# Patient Record
Sex: FEMALE | Race: ASIAN | NOT HISPANIC OR LATINO
[De-identification: names, ages, dates, MRNs, and addresses within clinical notes are randomized per-mention and may not be internally consistent; named-entity substitution may affect disease eponyms.]

---

## 2017-01-09 ENCOUNTER — LABORATORY RESULT (OUTPATIENT)
Age: 42
End: 2017-01-09

## 2017-01-09 ENCOUNTER — APPOINTMENT (OUTPATIENT)
Dept: INTERNAL MEDICINE | Facility: CLINIC | Age: 42
End: 2017-01-09

## 2017-01-09 ENCOUNTER — FORM ENCOUNTER (OUTPATIENT)
Age: 42
End: 2017-01-09

## 2017-01-09 VITALS
SYSTOLIC BLOOD PRESSURE: 130 MMHG | DIASTOLIC BLOOD PRESSURE: 82 MMHG | BODY MASS INDEX: 36.44 KG/M2 | HEIGHT: 61 IN | WEIGHT: 193 LBS | TEMPERATURE: 98.8 F

## 2017-01-09 DIAGNOSIS — R10.13 EPIGASTRIC PAIN: ICD-10-CM

## 2017-01-10 ENCOUNTER — APPOINTMENT (OUTPATIENT)
Dept: ULTRASOUND IMAGING | Facility: CLINIC | Age: 42
End: 2017-01-10

## 2017-01-10 ENCOUNTER — OUTPATIENT (OUTPATIENT)
Dept: OUTPATIENT SERVICES | Facility: HOSPITAL | Age: 42
LOS: 1 days | End: 2017-01-10
Payer: COMMERCIAL

## 2017-01-10 DIAGNOSIS — R10.13 EPIGASTRIC PAIN: ICD-10-CM

## 2017-01-10 LAB
ALBUMIN SERPL ELPH-MCNC: 3.7 G/DL
ALP BLD-CCNC: 68 U/L
ALT SERPL-CCNC: 106 U/L
AMYLASE/CREAT SERPL: 60 U/L
ANION GAP SERPL CALC-SCNC: 11 MMOL/L
AST SERPL-CCNC: 125 U/L
BASOPHILS # BLD AUTO: 0 K/UL
BASOPHILS NFR BLD AUTO: 0 %
BILIRUB SERPL-MCNC: 0.3 MG/DL
BUN SERPL-MCNC: 15 MG/DL
CALCIUM SERPL-MCNC: 9.6 MG/DL
CHLORIDE SERPL-SCNC: 102 MMOL/L
CO2 SERPL-SCNC: 22 MMOL/L
CREAT SERPL-MCNC: 0.93 MG/DL
EOSINOPHIL # BLD AUTO: 0.08 K/UL
EOSINOPHIL NFR BLD AUTO: 0.9 %
GLUCOSE SERPL-MCNC: 134 MG/DL
HCT VFR BLD CALC: 40 %
HGB BLD-MCNC: 12.7 G/DL
LPL SERPL-CCNC: 33 U/L
LYMPHOCYTES # BLD AUTO: 1.8 K/UL
LYMPHOCYTES NFR BLD AUTO: 20 %
MAN DIFF?: NORMAL
MCHC RBC-ENTMCNC: 27.1 PG
MCHC RBC-ENTMCNC: 31.8 GM/DL
MCV RBC AUTO: 85.5 FL
MONOCYTES # BLD AUTO: 0.66 K/UL
MONOCYTES NFR BLD AUTO: 7.3 %
NEUTROPHILS # BLD AUTO: 6.06 K/UL
NEUTROPHILS NFR BLD AUTO: 67.3 %
PLATELET # BLD AUTO: 265 K/UL
POTASSIUM SERPL-SCNC: 4.5 MMOL/L
PROT SERPL-MCNC: 7.7 G/DL
RBC # BLD: 4.68 M/UL
RBC # FLD: 14.6 %
SODIUM SERPL-SCNC: 135 MMOL/L
WBC # FLD AUTO: 9.01 K/UL

## 2017-01-10 PROCEDURE — 76700 US EXAM ABDOM COMPLETE: CPT

## 2017-01-25 ENCOUNTER — OUTPATIENT (OUTPATIENT)
Dept: OUTPATIENT SERVICES | Facility: HOSPITAL | Age: 42
LOS: 1 days | End: 2017-01-25

## 2017-01-25 VITALS
HEIGHT: 65 IN | DIASTOLIC BLOOD PRESSURE: 76 MMHG | RESPIRATION RATE: 18 BRPM | HEART RATE: 72 BPM | TEMPERATURE: 98 F | WEIGHT: 198.42 LBS | OXYGEN SATURATION: 98 % | SYSTOLIC BLOOD PRESSURE: 115 MMHG

## 2017-01-25 DIAGNOSIS — Z98.890 OTHER SPECIFIED POSTPROCEDURAL STATES: Chronic | ICD-10-CM

## 2017-01-25 DIAGNOSIS — Z01.818 ENCOUNTER FOR OTHER PREPROCEDURAL EXAMINATION: ICD-10-CM

## 2017-01-25 DIAGNOSIS — K80.20 CALCULUS OF GALLBLADDER WITHOUT CHOLECYSTITIS WITHOUT OBSTRUCTION: ICD-10-CM

## 2017-01-25 DIAGNOSIS — K80.60 CALCULUS OF GALLBLADDER AND BILE DUCT WITH CHOLECYSTITIS, UNSPECIFIED, WITHOUT OBSTRUCTION: ICD-10-CM

## 2017-01-25 RX ORDER — CEFAZOLIN SODIUM 1 G
2000 VIAL (EA) INJECTION ONCE
Qty: 0 | Refills: 0 | Status: DISCONTINUED | OUTPATIENT
Start: 2017-01-31 | End: 2017-02-15

## 2017-01-25 NOTE — H&P PST ADULT - NEUROLOGICAL DETAILS
responds to pain/cranial nerves intact/sensation intact/alert and oriented x 3/responds to verbal commands

## 2017-01-25 NOTE — H&P PST ADULT - LYMPHATIC
anterior cervical R/anterior cervical L/supraclavicular L/posterior cervical R/supraclavicular R/posterior cervical L

## 2017-01-25 NOTE — H&P PST ADULT - NSANTHOSAYNRD_GEN_A_CORE
No. ORESTES screening performed.  STOP BANG Legend: 0-2 = LOW Risk; 3-4 = INTERMEDIATE Risk; 5-8 = HIGH Risk

## 2017-01-25 NOTE — H&P PST ADULT - RS GEN PE MLT RESP DETAILS PC
normal/good air movement/breath sounds equal/airway patent/respirations non-labored/clear to auscultation bilaterally

## 2017-01-25 NOTE — H&P PST ADULT - HISTORY OF PRESENT ILLNESS
42 y/o old female with no PMH ,Patient has been experiencing epigastric ,mostly RUQ pain which radiating to the back since 1/9/2017 , seen by PMD all work up done. Pt denies any chest pain ,SOB, Palpitations ,nausea or vomiting, fever or chills. Now coming in for Laparoscopic cholecystectomy on 1/31/2017. 42 y/o old female with no PMH ,Patient has been experiencing epigastric, mostly RUQ pain which radiating to the back since 1/9/2017 , seen by PMD all work up done. Pt denies any chest pain ,SOB, Palpitations ,nausea or vomiting, fever or chills. Now coming in for Laparoscopic cholecystectomy on 1/31/2017. 40 y/o old female with no PMH ,Patient has been experiencing epigastric, mostly RUQ pain which radiating to the back since 1/9/2017. Pt was seen by PMD and gastroenterologist all work up done.Pt denies any chest pain ,SOB, Palpitations ,nausea , vomiting, fever or chills. Now coming in for Laparoscopic cholecystectomy on 1/31/2017. 40 y/o old female with no PMH ,Patient has been experiencing epigastric pain,mostly RUQ area radiating to the back since 1/9/2017. Pt was seen by PMD and gastroenterologist all work up done. Pt denies any chest pain ,SOB, Palpitations ,nausea ,vomiting, fever or chills. Now coming in for Laparoscopic cholecystectomy on 1/31/2017.

## 2017-01-25 NOTE — H&P PST ADULT - MUSCULOSKELETAL
negative detailed exam no calf tenderness/no joint erythema/normal/no joint swelling/ROM intact/no joint warmth

## 2017-01-30 ENCOUNTER — RESULT REVIEW (OUTPATIENT)
Age: 42
End: 2017-01-30

## 2017-01-31 ENCOUNTER — OUTPATIENT (OUTPATIENT)
Dept: OUTPATIENT SERVICES | Facility: HOSPITAL | Age: 42
LOS: 1 days | End: 2017-01-31
Payer: COMMERCIAL

## 2017-01-31 ENCOUNTER — TRANSCRIPTION ENCOUNTER (OUTPATIENT)
Age: 42
End: 2017-01-31

## 2017-01-31 VITALS
OXYGEN SATURATION: 100 % | HEART RATE: 80 BPM | TEMPERATURE: 98 F | RESPIRATION RATE: 20 BRPM | SYSTOLIC BLOOD PRESSURE: 122 MMHG | DIASTOLIC BLOOD PRESSURE: 86 MMHG

## 2017-01-31 VITALS
TEMPERATURE: 98 F | DIASTOLIC BLOOD PRESSURE: 76 MMHG | HEART RATE: 89 BPM | RESPIRATION RATE: 17 BRPM | SYSTOLIC BLOOD PRESSURE: 122 MMHG | OXYGEN SATURATION: 99 %

## 2017-01-31 DIAGNOSIS — K80.20 CALCULUS OF GALLBLADDER WITHOUT CHOLECYSTITIS WITHOUT OBSTRUCTION: ICD-10-CM

## 2017-01-31 DIAGNOSIS — Z98.890 OTHER SPECIFIED POSTPROCEDURAL STATES: Chronic | ICD-10-CM

## 2017-01-31 LAB — HCG UR QL: NEGATIVE — SIGNIFICANT CHANGE UP

## 2017-01-31 PROCEDURE — 81025 URINE PREGNANCY TEST: CPT

## 2017-01-31 PROCEDURE — 47562 LAPAROSCOPIC CHOLECYSTECTOMY: CPT

## 2017-01-31 RX ORDER — ONDANSETRON 8 MG/1
4 TABLET, FILM COATED ORAL ONCE
Qty: 0 | Refills: 0 | Status: COMPLETED | OUTPATIENT
Start: 2017-01-31 | End: 2017-01-31

## 2017-01-31 RX ORDER — SODIUM CHLORIDE 9 MG/ML
3 INJECTION INTRAMUSCULAR; INTRAVENOUS; SUBCUTANEOUS EVERY 8 HOURS
Qty: 0 | Refills: 0 | Status: DISCONTINUED | OUTPATIENT
Start: 2017-01-31 | End: 2017-01-31

## 2017-01-31 RX ORDER — HYDROMORPHONE HYDROCHLORIDE 2 MG/ML
0.5 INJECTION INTRAMUSCULAR; INTRAVENOUS; SUBCUTANEOUS
Qty: 0 | Refills: 0 | Status: DISCONTINUED | OUTPATIENT
Start: 2017-01-31 | End: 2017-01-31

## 2017-01-31 RX ORDER — SODIUM CHLORIDE 9 MG/ML
1000 INJECTION, SOLUTION INTRAVENOUS
Qty: 0 | Refills: 0 | Status: DISCONTINUED | OUTPATIENT
Start: 2017-01-31 | End: 2017-02-15

## 2017-01-31 RX ORDER — OXYCODONE AND ACETAMINOPHEN 5; 325 MG/1; MG/1
1 TABLET ORAL
Qty: 15 | Refills: 0
Start: 2017-01-31

## 2017-01-31 RX ORDER — NORETHINDRONE AND ETHINYL ESTRADIOL 0.4-0.035
1 KIT ORAL
Qty: 0 | Refills: 0 | COMMUNITY

## 2017-01-31 RX ADMIN — HYDROMORPHONE HYDROCHLORIDE 0.5 MILLIGRAM(S): 2 INJECTION INTRAMUSCULAR; INTRAVENOUS; SUBCUTANEOUS at 20:45

## 2017-01-31 RX ADMIN — HYDROMORPHONE HYDROCHLORIDE 0.5 MILLIGRAM(S): 2 INJECTION INTRAMUSCULAR; INTRAVENOUS; SUBCUTANEOUS at 21:00

## 2017-01-31 RX ADMIN — SODIUM CHLORIDE 50 MILLILITER(S): 9 INJECTION, SOLUTION INTRAVENOUS at 20:54

## 2017-01-31 RX ADMIN — ONDANSETRON 4 MILLIGRAM(S): 8 TABLET, FILM COATED ORAL at 21:40

## 2017-01-31 NOTE — BRIEF OPERATIVE NOTE - POST-OP DX
Biliary colic  01/31/2017    Active  Last, Aj KELLY  Chronic cholecystitis  01/31/2017    Active  Last, Aj KELLY

## 2017-01-31 NOTE — BRIEF OPERATIVE NOTE - OPERATION/FINDINGS
Mildly intrahepatic gallbladder. Chronic cholecystitis. Laparoscopic cholecystectomy performed without apparent injury to surrounding structures.

## 2017-01-31 NOTE — ASU DISCHARGE PLAN (ADULT/PEDIATRIC). - MEDICATION SUMMARY - MEDICATIONS TO TAKE
I will START or STAY ON the medications listed below when I get home from the hospital:    acetaminophen-oxyCODONE 325 mg-5 mg oral tablet  -- 1 to 2 tab(s) by mouth every 4 to 6 hours, As Needed for Moderate to Severe Pain MDD:8 tablets  -- Caution federal law prohibits the transfer of this drug to any person other  than the person for whom it was prescribed.  May cause drowsiness.  Alcohol may intensify this effect.  Use care when operating dangerous machinery.  This prescription cannot be refilled.  This product contains acetaminophen.  Do not use  with any other product containing acetaminophen to prevent possible liver damage.  Using more of this medication than prescribed may cause serious breathing problems.    -- Indication: For Pain control

## 2017-01-31 NOTE — ASU DISCHARGE PLAN (ADULT/PEDIATRIC). - SPECIAL INSTRUCTIONS
Please follow up with Dr. Fam within 1-2 weeks after discharge from the hospital. You may call [phone] to schedule an appointment. Shower after 24 hours from surgery. Pat wounds dry, do not scrub. You have special dressings on your wounds called steri-strips. These will fall on their own in 1-2 weeks or will be removed in office. Do not scrub them after showering. Take pain medication as needed. A prescription has been submitted to your pharmacy for you.

## 2017-02-07 LAB — SURGICAL PATHOLOGY STUDY: SIGNIFICANT CHANGE UP

## 2017-04-06 ENCOUNTER — CHART COPY (OUTPATIENT)
Age: 42
End: 2017-04-06

## 2017-05-04 ENCOUNTER — EMERGENCY (EMERGENCY)
Facility: HOSPITAL | Age: 42
LOS: 1 days | Discharge: HOME | End: 2017-05-04
Attending: EMERGENCY MEDICINE | Admitting: EMERGENCY MEDICINE
Payer: COMMERCIAL

## 2017-05-04 ENCOUNTER — APPOINTMENT (OUTPATIENT)
Dept: INTERNAL MEDICINE | Facility: CLINIC | Age: 42
End: 2017-05-04

## 2017-05-04 VITALS
TEMPERATURE: 98 F | SYSTOLIC BLOOD PRESSURE: 126 MMHG | HEART RATE: 85 BPM | DIASTOLIC BLOOD PRESSURE: 86 MMHG | RESPIRATION RATE: 17 BRPM | OXYGEN SATURATION: 98 %

## 2017-05-04 VITALS
HEART RATE: 121 BPM | TEMPERATURE: 99 F | RESPIRATION RATE: 17 BRPM | SYSTOLIC BLOOD PRESSURE: 140 MMHG | DIASTOLIC BLOOD PRESSURE: 94 MMHG | OXYGEN SATURATION: 97 %

## 2017-05-04 DIAGNOSIS — Z98.890 OTHER SPECIFIED POSTPROCEDURAL STATES: Chronic | ICD-10-CM

## 2017-05-04 DIAGNOSIS — Z88.5 ALLERGY STATUS TO NARCOTIC AGENT: ICD-10-CM

## 2017-05-04 DIAGNOSIS — R21 RASH AND OTHER NONSPECIFIC SKIN ERUPTION: ICD-10-CM

## 2017-05-04 DIAGNOSIS — Z88.8 ALLERGY STATUS TO OTHER DRUGS, MEDICAMENTS AND BIOLOGICAL SUBSTANCES STATUS: ICD-10-CM

## 2017-05-04 DIAGNOSIS — Z79.899 OTHER LONG TERM (CURRENT) DRUG THERAPY: ICD-10-CM

## 2017-05-04 DIAGNOSIS — L50.9 URTICARIA, UNSPECIFIED: ICD-10-CM

## 2017-05-04 DIAGNOSIS — R00.0 TACHYCARDIA, UNSPECIFIED: ICD-10-CM

## 2017-05-04 DIAGNOSIS — Z98.890 OTHER SPECIFIED POSTPROCEDURAL STATES: ICD-10-CM

## 2017-05-04 PROCEDURE — 99283 EMERGENCY DEPT VISIT LOW MDM: CPT

## 2017-05-04 RX ORDER — AZITHROMYCIN 250 MG/1
250 TABLET, FILM COATED ORAL
Qty: 1 | Refills: 1 | Status: DISCONTINUED | COMMUNITY
Start: 2017-04-06 | End: 2017-05-04

## 2017-05-04 RX ORDER — FAMOTIDINE 10 MG/ML
20 INJECTION INTRAVENOUS DAILY
Qty: 0 | Refills: 0 | Status: DISCONTINUED | OUTPATIENT
Start: 2017-05-04 | End: 2017-05-08

## 2017-05-04 RX ADMIN — FAMOTIDINE 20 MILLIGRAM(S): 10 INJECTION INTRAVENOUS at 20:37

## 2017-05-04 RX ADMIN — Medication 60 MILLIGRAM(S): at 20:37

## 2017-05-04 NOTE — ED PROVIDER NOTE - OBJECTIVE STATEMENT
41 year old female with no significant PMH presents c/o hives x2 hours. Patient states that she ate sushi at 5:30pm today and noticed several hives and extreme pruritis of her whole body within 20 minutes.  She took 2 benadryl at 6pm which reduced her pruritis significantly.  She denies HA, N/V, changes in her voice, difficulty breathing, difficulty swallowing.     Patient was seen by her PMD earlier today for consultation of a "red yifan" on the interior portion of her left knee.  Patient states that she was prescribed an antifungal for her leg but the prescription was never sent to her pharmacy. 41 year old female with no significant PMH presents c/o hives x2 hours. Patient states that she ate sushi at 5:30pm today and noticed several hives and extreme pruritis of her whole body within 20 minutes.  She took 2 benadryl at 6pm which reduced her pruritis significantly.  She denies HA, N/V, changes in her voice, difficulty breathing, difficulty swallowing.      Patient was seen by her PMD earlier today for consultation of a "red yifan" on the interior portion of her left knee.  Patient states that she was prescribed an antifungal for her leg but the prescription was never sent to her pharmacy.       Attending note. Patient was seen in fast track room #6. Agree with the above. Patient had sushi at approximately 5:30 in detail to high at approximately 5:50 PM patient took 50 mg of Benadryl at 6 PM. Patient had itchy rash over most of her body. Patient had a similar reaction many years ago.  Patient has been at that restaurant and is a male previously without ill effect. Patient denies any difficulty breathing, change in voice or swelling of the lips, tongue or pharynx. Patient denies any chest tightness or abdominal pain. Patient denies any lightheadedness.       Patient saw her PMD today for a rash on the medial left knee was prescribed antifungal which the patient has not yet received.

## 2017-05-04 NOTE — ED PROVIDER NOTE - CARE PLAN
Principal Discharge DX:	Hives Principal Discharge DX:	Hives  Instructions for follow-up, activity and diet:	1. Take Prednisone 2, 20mg tablets once a day for 4 days. Take Benadryl as needed for symptom relief.    2. Apply Clotrimazole cream 2 times a day over left inner knee rash for 2 weeks  3. Follow up with PCP within 24-48 hours  4. Return to ED for worsening of symptoms including difficulty breathing, shortness of breath, HA, dizziness, worsening of rash, fever and other concerning symptoms.

## 2017-05-04 NOTE — ED ADULT NURSE NOTE - PMH
Calculus of gallbladder and bile duct w/cholecystitis w/o obstruction    Cervical radiculopathy    Obesity (BMI 30-39.9)    Shoulder impingement, right  Rt shoulder

## 2017-05-04 NOTE — ED PROVIDER NOTE - MEDICAL DECISION MAKING DETAILS
Attending note-allergic reaction most likely to sushi. Patient took Benadryl 50 mg with improvement. Steroids and H2 blocker. Prescription for steroids for the next 48-72 hours and continued Benadryl q.6 hours. Patient advised to return to the ER if any airway issues arise.

## 2017-05-04 NOTE — ED PROVIDER NOTE - PLAN OF CARE
1. Take Prednisone 2, 20mg tablets once a day for 4 days. Take Benadryl as needed for symptom relief.    2. Apply Clotrimazole cream 2 times a day over left inner knee rash for 2 weeks  3. Follow up with PCP within 24-48 hours  4. Return to ED for worsening of symptoms including difficulty breathing, shortness of breath, HA, dizziness, worsening of rash, fever and other concerning symptoms.

## 2017-05-04 NOTE — ED ADULT NURSE NOTE - OBJECTIVE STATEMENT
41 y.o F c/o generalized hives and itching s/p eating sushi at 5:30 PM today. A&Ox3, ambulatory. Pt states after eating sushi,  20 min later she developed hives and itching. Upon exam hives noted to back, abdomen, chest, B/L upper and lower extremities. Pt took 2 benadryl PTA. Airway patent, respirations CTA B/L, nonlabored, no tongue or lip swelling no difficulty swallowing. O2 sat 98% RA. Denies CP, SOB, N/V/D, fevers, chills, HA, dizziness. Safety and comfort provided.

## 2017-05-04 NOTE — ED PROVIDER NOTE - SKIN RASH DESCRIPTION
RAISED/confluent raised non-blanching wheals over chest, back, abdomen and lower extremities b/l/WHEAL/REDDENED

## 2017-05-05 ENCOUNTER — OTHER (OUTPATIENT)
Age: 42
End: 2017-05-05

## 2017-05-08 ENCOUNTER — OTHER (OUTPATIENT)
Age: 42
End: 2017-05-08

## 2017-05-08 ENCOUNTER — MESSAGE (OUTPATIENT)
Age: 42
End: 2017-05-08

## 2017-06-07 ENCOUNTER — RESULT REVIEW (OUTPATIENT)
Age: 42
End: 2017-06-07

## 2017-06-14 ENCOUNTER — APPOINTMENT (OUTPATIENT)
Dept: ORTHOPEDIC SURGERY | Facility: CLINIC | Age: 42
End: 2017-06-14

## 2017-06-26 ENCOUNTER — APPOINTMENT (OUTPATIENT)
Dept: ORTHOPEDIC SURGERY | Facility: CLINIC | Age: 42
End: 2017-06-26

## 2017-06-26 VITALS
SYSTOLIC BLOOD PRESSURE: 106 MMHG | WEIGHT: 192 LBS | HEART RATE: 60 BPM | HEIGHT: 61 IN | BODY MASS INDEX: 36.25 KG/M2 | DIASTOLIC BLOOD PRESSURE: 71 MMHG

## 2017-07-05 ENCOUNTER — CHART COPY (OUTPATIENT)
Age: 42
End: 2017-07-05

## 2017-07-05 ENCOUNTER — APPOINTMENT (OUTPATIENT)
Dept: ORTHOPEDIC SURGERY | Facility: CLINIC | Age: 42
End: 2017-07-05

## 2017-07-17 ENCOUNTER — APPOINTMENT (OUTPATIENT)
Dept: ORTHOPEDIC SURGERY | Facility: CLINIC | Age: 42
End: 2017-07-17

## 2017-07-17 VITALS — BODY MASS INDEX: 35.68 KG/M2 | HEIGHT: 61 IN | WEIGHT: 189 LBS

## 2017-07-24 ENCOUNTER — APPOINTMENT (OUTPATIENT)
Dept: ORTHOPEDIC SURGERY | Facility: CLINIC | Age: 42
End: 2017-07-24

## 2017-08-22 ENCOUNTER — APPOINTMENT (OUTPATIENT)
Dept: ORTHOPEDIC SURGERY | Facility: CLINIC | Age: 42
End: 2017-08-22
Payer: OTHER MISCELLANEOUS

## 2017-08-22 VITALS
HEART RATE: 92 BPM | DIASTOLIC BLOOD PRESSURE: 86 MMHG | BODY MASS INDEX: 35.68 KG/M2 | WEIGHT: 189 LBS | SYSTOLIC BLOOD PRESSURE: 132 MMHG | HEIGHT: 61 IN

## 2017-08-22 PROCEDURE — 73562 X-RAY EXAM OF KNEE 3: CPT | Mod: LT

## 2017-08-22 PROCEDURE — 99213 OFFICE O/P EST LOW 20 MIN: CPT

## 2017-09-13 ENCOUNTER — APPOINTMENT (OUTPATIENT)
Dept: ORTHOPEDIC SURGERY | Facility: CLINIC | Age: 42
End: 2017-09-13
Payer: OTHER MISCELLANEOUS

## 2017-09-13 VITALS
HEART RATE: 82 BPM | HEIGHT: 61 IN | SYSTOLIC BLOOD PRESSURE: 117 MMHG | DIASTOLIC BLOOD PRESSURE: 83 MMHG | WEIGHT: 187 LBS | BODY MASS INDEX: 35.3 KG/M2

## 2017-09-13 PROCEDURE — 99213 OFFICE O/P EST LOW 20 MIN: CPT

## 2017-11-08 ENCOUNTER — CHART COPY (OUTPATIENT)
Age: 42
End: 2017-11-08

## 2017-11-28 ENCOUNTER — APPOINTMENT (OUTPATIENT)
Dept: ORTHOPEDIC SURGERY | Facility: CLINIC | Age: 42
End: 2017-11-28
Payer: OTHER MISCELLANEOUS

## 2017-11-28 VITALS
WEIGHT: 187 LBS | HEIGHT: 61 IN | BODY MASS INDEX: 35.3 KG/M2 | HEART RATE: 85 BPM | SYSTOLIC BLOOD PRESSURE: 105 MMHG | DIASTOLIC BLOOD PRESSURE: 74 MMHG

## 2017-11-28 DIAGNOSIS — M76.52 PATELLAR TENDINITIS, LEFT KNEE: ICD-10-CM

## 2017-11-28 DIAGNOSIS — S82.002A UNSPECIFIED FRACTURE OF LEFT PATELLA, INITIAL ENCOUNTER FOR CLOSED FRACTURE: ICD-10-CM

## 2017-11-28 PROCEDURE — 99213 OFFICE O/P EST LOW 20 MIN: CPT

## 2017-12-06 DIAGNOSIS — M51.36 OTHER INTERVERTEBRAL DISC DEGENERATION, LUMBAR REGION: ICD-10-CM

## 2018-01-23 ENCOUNTER — APPOINTMENT (OUTPATIENT)
Dept: INFECTIOUS DISEASE | Facility: CLINIC | Age: 43
End: 2018-01-23
Payer: SELF-PAY

## 2018-01-23 DIAGNOSIS — Z71.89 OTHER SPECIFIED COUNSELING: ICD-10-CM

## 2018-01-23 PROCEDURE — 90472 IMMUNIZATION ADMIN EACH ADD: CPT | Mod: NC

## 2018-01-23 PROCEDURE — 99401 PREV MED CNSL INDIV APPRX 15: CPT | Mod: 25

## 2018-01-23 PROCEDURE — 90471 IMMUNIZATION ADMIN: CPT | Mod: NC

## 2018-01-23 PROCEDURE — 90632 HEPA VACCINE ADULT IM: CPT

## 2018-01-23 PROCEDURE — 90691 TYPHOID VACCINE IM: CPT

## 2018-01-25 ENCOUNTER — CHART COPY (OUTPATIENT)
Age: 43
End: 2018-01-25

## 2018-02-05 ENCOUNTER — OTHER (OUTPATIENT)
Age: 43
End: 2018-02-05

## 2018-02-12 ENCOUNTER — OTHER (OUTPATIENT)
Age: 43
End: 2018-02-12

## 2018-07-09 ENCOUNTER — RESULT REVIEW (OUTPATIENT)
Age: 43
End: 2018-07-09

## 2018-08-06 ENCOUNTER — OUTPATIENT (OUTPATIENT)
Dept: OUTPATIENT SERVICES | Facility: HOSPITAL | Age: 43
LOS: 1 days | End: 2018-08-06
Payer: COMMERCIAL

## 2018-08-06 ENCOUNTER — APPOINTMENT (OUTPATIENT)
Dept: MAMMOGRAPHY | Facility: IMAGING CENTER | Age: 43
End: 2018-08-06
Payer: COMMERCIAL

## 2018-08-06 DIAGNOSIS — Z00.8 ENCOUNTER FOR OTHER GENERAL EXAMINATION: ICD-10-CM

## 2018-08-06 DIAGNOSIS — Z98.890 OTHER SPECIFIED POSTPROCEDURAL STATES: Chronic | ICD-10-CM

## 2018-08-06 PROCEDURE — 77063 BREAST TOMOSYNTHESIS BI: CPT | Mod: 26

## 2018-08-06 PROCEDURE — 77067 SCR MAMMO BI INCL CAD: CPT | Mod: 26

## 2018-08-06 PROCEDURE — 77067 SCR MAMMO BI INCL CAD: CPT

## 2018-08-06 PROCEDURE — 77063 BREAST TOMOSYNTHESIS BI: CPT

## 2018-08-29 ENCOUNTER — LABORATORY RESULT (OUTPATIENT)
Age: 43
End: 2018-08-29

## 2018-08-29 ENCOUNTER — CHART COPY (OUTPATIENT)
Age: 43
End: 2018-08-29

## 2018-09-05 ENCOUNTER — CHART COPY (OUTPATIENT)
Age: 43
End: 2018-09-05

## 2018-09-24 ENCOUNTER — CHART COPY (OUTPATIENT)
Age: 43
End: 2018-09-24

## 2019-03-04 ENCOUNTER — APPOINTMENT (OUTPATIENT)
Dept: ORTHOPEDIC SURGERY | Facility: CLINIC | Age: 44
End: 2019-03-04
Payer: COMMERCIAL

## 2019-03-04 VITALS — DIASTOLIC BLOOD PRESSURE: 79 MMHG | SYSTOLIC BLOOD PRESSURE: 127 MMHG | HEART RATE: 106 BPM

## 2019-03-04 PROCEDURE — 99215 OFFICE O/P EST HI 40 MIN: CPT | Mod: 25

## 2019-03-04 PROCEDURE — 20605 DRAIN/INJ JOINT/BURSA W/O US: CPT | Mod: LT

## 2019-03-04 PROCEDURE — 73080 X-RAY EXAM OF ELBOW: CPT | Mod: LT

## 2019-03-04 RX ORDER — METHYLPRED ACET/NACL,ISO-OS/PF 40 MG/ML
40 VIAL (ML) INJECTION
Qty: 1 | Refills: 0 | Status: COMPLETED | OUTPATIENT
Start: 2019-03-04

## 2019-03-04 RX ORDER — LIDOCAINE HYDROCHLORIDE 10 MG/ML
1 INJECTION, SOLUTION INFILTRATION; PERINEURAL
Refills: 0 | Status: COMPLETED | OUTPATIENT
Start: 2019-03-04

## 2019-03-04 RX ADMIN — LIDOCAINE HYDROCHLORIDE 0.75 %: 10 INJECTION, SOLUTION INFILTRATION; PERINEURAL at 00:00

## 2019-03-04 RX ADMIN — METHYLPREDNISOLONE ACETATE 0.75 MG/ML: 40 INJECTION, SUSPENSION INTRALESIONAL; INTRAMUSCULAR; INTRASYNOVIAL; SOFT TISSUE at 00:00

## 2019-03-04 NOTE — PHYSICAL EXAM
[de-identified] : The patient appears well nourished  and in no apparent distress.  The patient is alert and oriented to person, place, and time.   Affect and mood appear normal.    The head is normocephalic and atraumatic.  The eyes reveal normal sclera and extra ocular muscles are intact.   The neck appears normal with no jugular venous distention or masses noted.   Skin shows normal turgor with no evidence of eczema or psoriasis.  No respiratory distress noted.  The patient ambulates with a normal gait.\par \par The left elbow has slight loss of extension with discomfort on terminal extension. Tenderness and mild swelling noted over the lateral epicondyle. Pain is noted with resisted extension of the wrist and middle finger.  There is no warmth or erythema.  There is no instability to varus or valgus stress.  Elbow strength is normal. Sensation is normal.     Pulses and capillary refill are normal.     No clubbing, cyanosis, or edema noted. No lymphadenopathy. \par  [de-identified] : AP,  lateral, and oblique views of the left elbow were obtained.  There is no evidence of fracture, subluxation, or dislocation of the elbow.  The joint spaces are well maintained without evidence of degenerative arthritis.  No osteochondral lesions are noted.

## 2019-03-04 NOTE — PROCEDURE
[de-identified] : Using sterile technique, 0.75cc of depomedrol (40mg/ml) and 0.75cc of 1% plain lidocaine was drawn up into a sterile 3cc syringe.  A sterile 25 gauge needle was then affixed to the syringe.  The left lateral epicondylar area was then sterilely prepped with chlorhexidine and ethylene chloride spray was used as an anesthetic prior to injection.  The depomedrol/lidocaine mixture was injected just distal to the epicondyle.  The patient tolerated the procedure well without difficulty.  The patient was given instructions on the use of ice and anti-inflammatories post injection site soreness.  \par

## 2019-03-04 NOTE — DISCUSSION/SUMMARY
[de-identified] : Patient has evidence of a lateral epicondylitis left elbow. On today's exam I injected the elbow with Depo-Medrol and lidocaine. I gave her some stretching exercises. I would like to see her back in the next 3-4 weeks for reevaluation. Instructions were given post injection for ice analgesics.

## 2019-03-04 NOTE — HISTORY OF PRESENT ILLNESS
[de-identified] : This patient presents today with complaints of left elbow pain. He has been on naproxen 4 months. Pain is localized to the lateral epicondyle of the elbow. It is now relatively constant. Pain is worse in extension and lifting objects against gravity. Pain is 7/10. Patient cannot take anti-inflammatories due to her allergies.

## 2019-07-29 ENCOUNTER — APPOINTMENT (OUTPATIENT)
Dept: ORTHOPEDIC SURGERY | Facility: CLINIC | Age: 44
End: 2019-07-29
Payer: COMMERCIAL

## 2019-07-29 PROCEDURE — 20605 DRAIN/INJ JOINT/BURSA W/O US: CPT | Mod: LT

## 2019-07-29 PROCEDURE — 99213 OFFICE O/P EST LOW 20 MIN: CPT | Mod: 25

## 2019-08-19 DIAGNOSIS — Z01.818 ENCOUNTER FOR OTHER PREPROCEDURAL EXAMINATION: ICD-10-CM

## 2019-08-22 ENCOUNTER — LABORATORY RESULT (OUTPATIENT)
Age: 44
End: 2019-08-22

## 2019-08-27 ENCOUNTER — CHART COPY (OUTPATIENT)
Age: 44
End: 2019-08-27

## 2019-09-10 ENCOUNTER — APPOINTMENT (OUTPATIENT)
Dept: MAMMOGRAPHY | Facility: IMAGING CENTER | Age: 44
End: 2019-09-10
Payer: COMMERCIAL

## 2019-09-10 ENCOUNTER — OUTPATIENT (OUTPATIENT)
Dept: OUTPATIENT SERVICES | Facility: HOSPITAL | Age: 44
LOS: 1 days | End: 2019-09-10
Payer: COMMERCIAL

## 2019-09-10 DIAGNOSIS — Z98.890 OTHER SPECIFIED POSTPROCEDURAL STATES: Chronic | ICD-10-CM

## 2019-09-10 DIAGNOSIS — Z00.00 ENCOUNTER FOR GENERAL ADULT MEDICAL EXAMINATION WITHOUT ABNORMAL FINDINGS: ICD-10-CM

## 2019-09-10 PROCEDURE — 77067 SCR MAMMO BI INCL CAD: CPT

## 2019-09-10 PROCEDURE — 77063 BREAST TOMOSYNTHESIS BI: CPT

## 2019-09-10 PROCEDURE — 77063 BREAST TOMOSYNTHESIS BI: CPT | Mod: 26

## 2019-09-10 PROCEDURE — 77067 SCR MAMMO BI INCL CAD: CPT | Mod: 26

## 2019-09-12 RX ORDER — CLOTRIMAZOLE AND BETAMETHASONE DIPROPIONATE 10; .5 MG/ML; MG/ML
1-0.05 LOTION TOPICAL TWICE DAILY
Qty: 1 | Refills: 0 | Status: DISCONTINUED | COMMUNITY
Start: 2017-05-04 | End: 2019-09-12

## 2019-09-13 ENCOUNTER — APPOINTMENT (OUTPATIENT)
Dept: SURGERY | Facility: CLINIC | Age: 44
End: 2019-09-13

## 2019-12-11 ENCOUNTER — LABORATORY RESULT (OUTPATIENT)
Age: 44
End: 2019-12-11

## 2019-12-17 ENCOUNTER — CHART COPY (OUTPATIENT)
Age: 44
End: 2019-12-17

## 2020-02-26 RX ORDER — AZITHROMYCIN 250 MG/1
250 TABLET, FILM COATED ORAL
Qty: 1 | Refills: 1 | Status: ACTIVE | COMMUNITY
Start: 2020-02-26 | End: 1900-01-01

## 2020-02-27 ENCOUNTER — LABORATORY RESULT (OUTPATIENT)
Age: 45
End: 2020-02-27

## 2020-02-27 ENCOUNTER — APPOINTMENT (OUTPATIENT)
Dept: INTERNAL MEDICINE | Facility: CLINIC | Age: 45
End: 2020-02-27
Payer: COMMERCIAL

## 2020-02-27 VITALS
HEART RATE: 81 BPM | DIASTOLIC BLOOD PRESSURE: 80 MMHG | HEIGHT: 61 IN | SYSTOLIC BLOOD PRESSURE: 120 MMHG | TEMPERATURE: 97.6 F | OXYGEN SATURATION: 98 % | BODY MASS INDEX: 37.38 KG/M2 | WEIGHT: 198 LBS

## 2020-02-27 DIAGNOSIS — Z00.00 ENCOUNTER FOR GENERAL ADULT MEDICAL EXAMINATION W/OUT ABNORMAL FINDINGS: ICD-10-CM

## 2020-02-27 DIAGNOSIS — G47.00 INSOMNIA, UNSPECIFIED: ICD-10-CM

## 2020-02-27 DIAGNOSIS — F41.9 ANXIETY DISORDER, UNSPECIFIED: ICD-10-CM

## 2020-02-27 PROCEDURE — 99396 PREV VISIT EST AGE 40-64: CPT | Mod: 25

## 2020-02-27 PROCEDURE — 93000 ELECTROCARDIOGRAM COMPLETE: CPT

## 2020-02-27 PROCEDURE — 94010 BREATHING CAPACITY TEST: CPT

## 2020-02-27 RX ORDER — CLOBETASOL PROPIONATE 0.5 MG/G
0.05 CREAM TOPICAL TWICE DAILY
Qty: 30 | Refills: 2 | Status: DISCONTINUED | COMMUNITY
Start: 2017-02-23 | End: 2020-02-27

## 2020-02-27 RX ORDER — METAXALONE 800 MG/1
800 TABLET ORAL 3 TIMES DAILY
Qty: 30 | Refills: 0 | Status: DISCONTINUED | COMMUNITY
Start: 2017-11-08 | End: 2020-02-27

## 2020-02-27 RX ORDER — AZITHROMYCIN 250 MG/1
250 TABLET, FILM COATED ORAL
Qty: 4 | Refills: 0 | Status: DISCONTINUED | COMMUNITY
Start: 2018-01-23 | End: 2020-02-27

## 2020-02-27 RX ORDER — FLUOCINONIDE 0.5 MG/G
0.05 CREAM TOPICAL
Qty: 30 | Refills: 0 | Status: DISCONTINUED | COMMUNITY
Start: 2017-05-15 | End: 2020-02-27

## 2020-02-27 RX ORDER — CIPROFLOXACIN HYDROCHLORIDE 500 MG/1
500 TABLET, FILM COATED ORAL
Qty: 10 | Refills: 0 | Status: DISCONTINUED | COMMUNITY
Start: 2018-01-25 | End: 2020-02-27

## 2020-02-27 RX ORDER — LIDOCAINE 5% 700 MG/1
5 PATCH TOPICAL
Qty: 60 | Refills: 0 | Status: DISCONTINUED | COMMUNITY
Start: 2017-07-13 | End: 2020-02-27

## 2020-02-27 RX ORDER — ALPRAZOLAM 0.25 MG/1
0.25 TABLET ORAL
Qty: 30 | Refills: 0 | Status: DISCONTINUED | COMMUNITY
Start: 2018-02-12 | End: 2020-02-27

## 2020-02-27 RX ORDER — NORETHINDRONE 0.35 MG/1
0.35 TABLET ORAL
Qty: 28 | Refills: 0 | Status: DISCONTINUED | COMMUNITY
Start: 2017-06-07 | End: 2020-02-27

## 2020-02-27 RX ORDER — ERGOCALCIFEROL 1.25 MG/1
1.25 MG CAPSULE, LIQUID FILLED ORAL
Qty: 4 | Refills: 0 | Status: DISCONTINUED | COMMUNITY
Start: 2017-05-17 | End: 2020-02-27

## 2020-02-27 RX ORDER — PREDNISONE 20 MG/1
20 TABLET ORAL TWICE DAILY
Qty: 20 | Refills: 0 | Status: DISCONTINUED | COMMUNITY
Start: 2017-07-19 | End: 2020-02-27

## 2020-02-27 RX ORDER — CLOTRIMAZOLE AND BETAMETHASONE DIPROPIONATE 10; .5 MG/G; MG/G
1-0.05 CREAM TOPICAL TWICE DAILY
Qty: 15 | Refills: 0 | Status: DISCONTINUED | COMMUNITY
Start: 2018-01-25 | End: 2020-02-27

## 2020-02-27 RX ORDER — ALPRAZOLAM 0.25 MG/1
0.25 TABLET ORAL 3 TIMES DAILY
Qty: 30 | Refills: 0 | Status: DISCONTINUED | COMMUNITY
Start: 2018-02-05 | End: 2020-02-27

## 2020-02-27 RX ORDER — CLOTRIMAZOLE AND BETAMETHASONE DIPROPIONATE 10; .5 MG/G; MG/G
1-0.05 CREAM TOPICAL TWICE DAILY
Refills: 0 | Status: DISCONTINUED | COMMUNITY
Start: 2017-05-04 | End: 2020-02-27

## 2020-02-27 RX ORDER — PREDNISONE 20 MG/1
20 TABLET ORAL
Qty: 15 | Refills: 0 | Status: DISCONTINUED | COMMUNITY
Start: 2017-05-08 | End: 2020-02-27

## 2020-02-27 RX ORDER — LIDOCAINE 5% 700 MG/1
5 PATCH TOPICAL
Qty: 1 | Refills: 2 | Status: DISCONTINUED | COMMUNITY
Start: 2017-12-06 | End: 2020-02-27

## 2020-02-27 RX ORDER — CLOTRIMAZOLE 10 MG/G
1 CREAM TOPICAL
Qty: 15 | Refills: 0 | Status: DISCONTINUED | COMMUNITY
Start: 2017-05-04 | End: 2020-02-27

## 2020-02-27 NOTE — REVIEW OF SYSTEMS
[Negative] : Genitourinary [Fever] : no fever [Night Sweats] : no night sweats [FreeTextEntry9] : hip pain

## 2020-02-27 NOTE — ASSESSMENT
[FreeTextEntry1] : \par panic attacks - given palpitations, check labs and echo\par \par overweight - to increase exercise and watch diet\par \par hcm\par gyn / mammo / Pap\par CPE labs

## 2020-02-27 NOTE — HISTORY OF PRESENT ILLNESS
[de-identified] : \par 44 year old [employer] employee\par \par may have had 6 panic attacks since December - didn't feel faint; lasted 5 minutes\par palpitations, anxiety, hot\par a doctor at work took her pulse during one and it was normal

## 2020-02-27 NOTE — PHYSICAL EXAM
[No Acute Distress] : no acute distress [Well Nourished] : well nourished [Well Developed] : well developed [Well-Appearing] : well-appearing [Normal Sclera/Conjunctiva] : normal sclera/conjunctiva [PERRL] : pupils equal round and reactive to light [EOMI] : extraocular movements intact [Normal Oropharynx] : the oropharynx was normal [No JVD] : no jugular venous distention [Normal Outer Ear/Nose] : the outer ears and nose were normal in appearance [No Lymphadenopathy] : no lymphadenopathy [Supple] : supple [No Respiratory Distress] : no respiratory distress  [Thyroid Normal, No Nodules] : the thyroid was normal and there were no nodules present [Clear to Auscultation] : lungs were clear to auscultation bilaterally [No Accessory Muscle Use] : no accessory muscle use [Normal Rate] : normal rate  [Regular Rhythm] : with a regular rhythm [Normal S1, S2] : normal S1 and S2 [No Murmur] : no murmur heard [No Carotid Bruits] : no carotid bruits [No Abdominal Bruit] : a ~M bruit was not heard ~T in the abdomen [No Varicosities] : no varicosities [Pedal Pulses Present] : the pedal pulses are present [No Extremity Clubbing/Cyanosis] : no extremity clubbing/cyanosis [No Edema] : there was no peripheral edema [No Palpable Aorta] : no palpable aorta [Soft] : abdomen soft [Non Tender] : non-tender [Non-distended] : non-distended [No Masses] : no abdominal mass palpated [No HSM] : no HSM [Normal Posterior Cervical Nodes] : no posterior cervical lymphadenopathy [Normal Bowel Sounds] : normal bowel sounds [No CVA Tenderness] : no CVA  tenderness [Normal Anterior Cervical Nodes] : no anterior cervical lymphadenopathy [No Joint Swelling] : no joint swelling [No Spinal Tenderness] : no spinal tenderness [Grossly Normal Strength/Tone] : grossly normal strength/tone [No Focal Deficits] : no focal deficits [Coordination Grossly Intact] : coordination grossly intact [No Rash] : no rash [Deep Tendon Reflexes (DTR)] : deep tendon reflexes were 2+ and symmetric [Normal Gait] : normal gait [Normal Affect] : the affect was normal [Normal Insight/Judgement] : insight and judgment were intact

## 2020-02-27 NOTE — HEALTH RISK ASSESSMENT
[Very Good] : ~his/her~  mood as very good [Yes] : Yes [0] : 2) Feeling down, depressed, or hopeless: Not at all (0) [] : No [de-identified] : socially [de-identified] : walking 15378 steps/day [de-identified] : calorie counting [[id number]] : 0 [MammogramDate] : 10/19 [MammogramComments] : gyn = Dr. Carreon [PapSmearDate] : 10/18 [de-identified] : Patient sees an ophthalmologist regularly [de-identified] : Patient sees a dentist regularly

## 2020-02-28 ENCOUNTER — APPOINTMENT (OUTPATIENT)
Dept: INTERNAL MEDICINE | Facility: CLINIC | Age: 45
End: 2020-02-28
Payer: COMMERCIAL

## 2020-02-28 PROCEDURE — 36415 COLL VENOUS BLD VENIPUNCTURE: CPT

## 2020-03-03 LAB
ALBUMIN SERPL ELPH-MCNC: 4.3 G/DL
ALP BLD-CCNC: 70 U/L
ALT SERPL-CCNC: 25 U/L
ANION GAP SERPL CALC-SCNC: 12 MMOL/L
APPEARANCE: CLEAR
AST SERPL-CCNC: 21 U/L
BASOPHILS # BLD AUTO: 0.1 K/UL
BASOPHILS NFR BLD AUTO: 1.2 %
BILIRUB SERPL-MCNC: 0.2 MG/DL
BILIRUBIN URINE: NEGATIVE
BLOOD URINE: ABNORMAL
BUN SERPL-MCNC: 16 MG/DL
CALCIUM SERPL-MCNC: 9.9 MG/DL
CHLORIDE SERPL-SCNC: 104 MMOL/L
CHOLEST SERPL-MCNC: 191 MG/DL
CHOLEST/HDLC SERPL: 4.3 RATIO
CO2 SERPL-SCNC: 25 MMOL/L
COLOR: YELLOW
CREAT SERPL-MCNC: 0.84 MG/DL
EOSINOPHIL # BLD AUTO: 0.24 K/UL
EOSINOPHIL NFR BLD AUTO: 2.9 %
GLUCOSE QUALITATIVE U: NEGATIVE
GLUCOSE SERPL-MCNC: 109 MG/DL
HCT VFR BLD CALC: 45.2 %
HDLC SERPL-MCNC: 44 MG/DL
HGB BLD-MCNC: 13.7 G/DL
IMM GRANULOCYTES NFR BLD AUTO: 0.5 %
KETONES URINE: NEGATIVE
LDLC SERPL CALC-MCNC: 121 MG/DL
LDLC SERPL DIRECT ASSAY-MCNC: 130 MG/DL
LEUKOCYTE ESTERASE URINE: NEGATIVE
LYMPHOCYTES # BLD AUTO: 2.3 K/UL
LYMPHOCYTES NFR BLD AUTO: 27.5 %
MAN DIFF?: NORMAL
MCHC RBC-ENTMCNC: 25.9 PG
MCHC RBC-ENTMCNC: 30.3 GM/DL
MCV RBC AUTO: 85.6 FL
MONOCYTES # BLD AUTO: 0.38 K/UL
MONOCYTES NFR BLD AUTO: 4.5 %
NEUTROPHILS # BLD AUTO: 5.31 K/UL
NEUTROPHILS NFR BLD AUTO: 63.4 %
NITRITE URINE: NEGATIVE
PH URINE: 5.5
PLATELET # BLD AUTO: 289 K/UL
POTASSIUM SERPL-SCNC: 4.4 MMOL/L
PROT SERPL-MCNC: 8 G/DL
PROTEIN URINE: NORMAL
RBC # BLD: 5.28 M/UL
RBC # FLD: 13.9 %
SAVE SPECIMEN: NORMAL
SODIUM SERPL-SCNC: 141 MMOL/L
SPECIFIC GRAVITY URINE: 1.03
T4 FREE SERPL-MCNC: 1.1 NG/DL
TRIGL SERPL-MCNC: 130 MG/DL
TSH SERPL-ACNC: 3.24 UIU/ML
UROBILINOGEN URINE: NORMAL
WBC # FLD AUTO: 8.37 K/UL

## 2020-04-25 ENCOUNTER — MESSAGE (OUTPATIENT)
Age: 45
End: 2020-04-25

## 2020-05-06 LAB
SARS-COV-2 IGG SERPL IA-ACNC: <0.1 INDEX
SARS-COV-2 IGG SERPL QL IA: NEGATIVE

## 2020-05-07 ENCOUNTER — APPOINTMENT (OUTPATIENT)
Dept: ORTHOPEDIC SURGERY | Facility: CLINIC | Age: 45
End: 2020-05-07
Payer: COMMERCIAL

## 2020-05-07 VITALS — SYSTOLIC BLOOD PRESSURE: 131 MMHG | HEART RATE: 93 BPM | DIASTOLIC BLOOD PRESSURE: 85 MMHG

## 2020-05-07 DIAGNOSIS — M79.2 NEURALGIA AND NEURITIS, UNSPECIFIED: ICD-10-CM

## 2020-05-07 PROCEDURE — 99214 OFFICE O/P EST MOD 30 MIN: CPT

## 2020-05-12 ENCOUNTER — APPOINTMENT (OUTPATIENT)
Dept: DISASTER EMERGENCY | Facility: CLINIC | Age: 45
End: 2020-05-12

## 2020-06-09 ENCOUNTER — OUTPATIENT (OUTPATIENT)
Dept: OUTPATIENT SERVICES | Facility: HOSPITAL | Age: 45
LOS: 1 days | End: 2020-06-09
Payer: COMMERCIAL

## 2020-06-09 ENCOUNTER — RESULT REVIEW (OUTPATIENT)
Age: 45
End: 2020-06-09

## 2020-06-09 ENCOUNTER — APPOINTMENT (OUTPATIENT)
Dept: MRI IMAGING | Facility: CLINIC | Age: 45
End: 2020-06-09
Payer: COMMERCIAL

## 2020-06-09 DIAGNOSIS — Z00.8 ENCOUNTER FOR OTHER GENERAL EXAMINATION: ICD-10-CM

## 2020-06-09 DIAGNOSIS — Z98.890 OTHER SPECIFIED POSTPROCEDURAL STATES: Chronic | ICD-10-CM

## 2020-06-09 PROCEDURE — 73221 MRI JOINT UPR EXTREM W/O DYE: CPT

## 2020-06-09 PROCEDURE — 73221 MRI JOINT UPR EXTREM W/O DYE: CPT | Mod: 26,LT

## 2020-06-12 ENCOUNTER — APPOINTMENT (OUTPATIENT)
Dept: ORTHOPEDIC SURGERY | Facility: CLINIC | Age: 45
End: 2020-06-12
Payer: COMMERCIAL

## 2020-06-12 PROCEDURE — 20551 NJX 1 TENDON ORIGIN/INSJ: CPT | Mod: LT

## 2020-06-12 PROCEDURE — 99214 OFFICE O/P EST MOD 30 MIN: CPT | Mod: 25

## 2020-06-12 RX ORDER — BETAMETHA AC,SOD PHOS/WATER/PF 6 MG/ML
6 (3-3) VIAL (ML) INJECTION
Qty: 1 | Refills: 0 | Status: COMPLETED | OUTPATIENT
Start: 2020-06-12

## 2020-06-12 RX ORDER — LIDOCAINE HYDROCHLORIDE 10 MG/ML
1 INJECTION, SOLUTION INFILTRATION; PERINEURAL
Refills: 0 | Status: COMPLETED | OUTPATIENT
Start: 2020-06-12

## 2020-06-12 RX ADMIN — LIDOCAINE HYDROCHLORIDE 1 %: 10 INJECTION, SOLUTION INFILTRATION; PERINEURAL at 00:00

## 2020-06-12 RX ADMIN — BETAMETHASONE ACETATE AND BETAMETHASONE SODIUM PHOSPHATE 1 MG/ML: 3; 3 INJECTION, SUSPENSION INTRA-ARTICULAR; INTRALESIONAL; INTRAMUSCULAR; SOFT TISSUE at 00:00

## 2020-06-12 NOTE — PROCEDURE
[FreeTextEntry1] : -  After a discussion of risks and benefits, the patient agreed to proceed with a cortisone injection.  \par -  Side: Left lateral epicondyar region.\par -  Medications injected: 1 cc of 1% Lidocaine and 1 cc of Betamethasone, 6mg/cc, using sterile technique.\par -  Patient tolerated the procedure well, without complications.\par -  Immediate partial improvement of the symptoms, secondary to the anesthetic effects of the injection, was noted.\par -  Patient was told that the pain usually worsens for a day or two, and should then begin to improve.  \par -  Instructions: Patient was instructed on the use of ice, anti-inflammatory agents or Tylenol, and activity modification. \par -  Follow-up: Within 2 weeks to assess response to the injection.

## 2020-06-12 NOTE — HISTORY OF PRESENT ILLNESS
[Right] : right hand dominant [FreeTextEntry1] : She comes in today for evaluation of left elbow and arm pain for the past 8 weeks.  She denies any injury.  She was referred by Dr. Haskins.  She had a complaints of some numbness and tingling and radiation down the arm.  There was a thought that this may represent a neuroma in addition to possibly lateral epicondylitis.  She was treated with a Medrol Dosepak 1 month ago with relief during the course, but then her symptoms recurred.  More recently she has noted more symptoms over the lateral epicondyle.

## 2020-06-12 NOTE — DISCUSSION/SUMMARY
[FreeTextEntry1] : She has findings consistent with left elbow and forearm pain secondary to lateral epicondylitis.  Her complaints are somewhat equivocal, but she did have an MRI which demonstrated mild lateral epicondylitis.\par \par I had a discussion regarding today's visit, the diagnosis, and treatment recommendations / options.  At this time I recommended a cortisone injection.  She understands that this may not provide her with long-term relief, and if it does not, then I would not recommend multiple injections in the future.\par \par The patient has agreed to this plan of management and has expressed full understanding.  All questions were fully answered to the patient's satisfaction.\par \par Over 50% of the time spent with the patient was on counseling the patient on the above diagnosis, treatment plan and prognosis.

## 2020-06-12 NOTE — PHYSICAL EXAM
[de-identified] : - Constitutional: This is a female in no obvious distress.  \par - Psych: Patient is alert and oriented to person, place and time.  Patient has a normal mood and affect.\par - Cardiovascular: Normal pulses throughout the upper extremities.  No significant varicosities are noted in the upper extremities. \par - Neuro: Strength and sensation are intact throughout the upper extremities.  Patient has normal coordination.\par - Respiratory:  Patient exhibits no evidence of shortness of breath or difficulty breathing.\par - Skin: No rashes, lesions, or other abnormalities are noted in the upper extremities.\par \par ---\par \par Examination of her left elbow demonstrates no focal swelling.  She has point tenderness along the anterior aspect of the lateral epicondyle.  She has pain in this region to resisted wrist extension.  There is no lateral joint line tenderness.  However, she is also quite tender anterior to the lateral epicondyle.  She states that pain radiates down this region to the arm and dorsal aspect of the thumb.  There is a negative Tinel overlying this area.  She has full flexion and extension of the digits.  She is neurologically intact distally along the radial, ulnar and median nerve distributions. [de-identified] : I reviewed her MRI which demonstrated mild lateral epicondylitis.  There was no evidence of a neuroma or other abnormalities.

## 2020-06-15 ENCOUNTER — APPOINTMENT (OUTPATIENT)
Dept: ORTHOPEDIC SURGERY | Facility: CLINIC | Age: 45
End: 2020-06-15
Payer: COMMERCIAL

## 2020-06-15 VITALS
BODY MASS INDEX: 37.38 KG/M2 | HEIGHT: 61 IN | SYSTOLIC BLOOD PRESSURE: 120 MMHG | DIASTOLIC BLOOD PRESSURE: 86 MMHG | HEART RATE: 71 BPM | WEIGHT: 198 LBS

## 2020-06-15 DIAGNOSIS — M54.5 LOW BACK PAIN: ICD-10-CM

## 2020-06-15 PROCEDURE — 99214 OFFICE O/P EST MOD 30 MIN: CPT

## 2020-06-15 PROCEDURE — 72110 X-RAY EXAM L-2 SPINE 4/>VWS: CPT

## 2020-06-15 PROCEDURE — 72170 X-RAY EXAM OF PELVIS: CPT | Mod: 59

## 2020-06-15 RX ORDER — METHYLPREDNISOLONE 4 MG/1
4 TABLET ORAL
Qty: 1 | Refills: 0 | Status: DISCONTINUED | COMMUNITY
Start: 2020-02-05 | End: 2020-06-15

## 2020-06-15 RX ORDER — LIDOCAINE 5% 700 MG/1
5 PATCH TOPICAL
Qty: 1 | Refills: 2 | Status: ACTIVE | COMMUNITY
Start: 2020-06-15 | End: 1900-01-01

## 2020-06-15 RX ORDER — TIZANIDINE 2 MG/1
2 TABLET ORAL EVERY 8 HOURS
Qty: 30 | Refills: 2 | Status: ACTIVE | COMMUNITY
Start: 2020-06-15 | End: 1900-01-01

## 2020-06-15 RX ORDER — PREDNISONE 20 MG/1
20 TABLET ORAL TWICE DAILY
Qty: 20 | Refills: 0 | Status: DISCONTINUED | COMMUNITY
Start: 2020-02-26 | End: 2020-06-15

## 2020-06-15 RX ORDER — METHYLPREDNISOLONE 4 MG/1
4 TABLET ORAL
Qty: 1 | Refills: 0 | Status: DISCONTINUED | COMMUNITY
Start: 2020-05-07 | End: 2020-06-15

## 2020-06-15 NOTE — PHYSICAL EXAM
[Stooped] : stooped [LE] : Sensory: Intact in bilateral lower extremities [Knee] : patellar 2+ and symmetric bilaterally [Ankle] : ankle 2+ and symmetric bilaterally [DP] : dorsalis pedis 2+ and symmetric bilaterally [PT] : posterior tibial 2+ and symmetric bilaterally [de-identified] : The pt is awake, alert and oriented to self, place and time, is comfortable and in no acute distress. Inspection of neck, back and lower extremities bilaterally reveals no rashes or ecchymotic lesions.   There is no tenderness over the cervical, thoracic or lumbar spine, or the paraspinal or upper and lower extremities musculature. There is no sacroiliac tenderness. No greater trochanteric tenderness bilaterally. No atrophy or abnormal movements noted in the upper or lower extremities. There is no swelling noted in the upper or lower extremities bilaterally. No cervical lymphadenopathy noted anteriorly. No joint laxity noted in the upper and lower extremity joints bilaterally.\par Hip range of motion is degrees internal rotation 30° external rotation without pain. Full range of motion of the shoulders bilaterally with no significant pain\par There is no groin pain with hip internal rotation and a negative SEVERINO test bilaterally.  [de-identified] : Leans to the right when standing\par Straight leg raise on the left side causes some groin discomfort and back discomfort without radiating leg pain. [de-identified] : 4 views lumbar spine obtained today demonstrate trunk shift to the right.  Normal lumbar lordosis.  No acute fractures.  No significant degeneration.  Minimal loss of disc height suspected L5-S1 but there are no degenerative changes associated with this.  No dynamic instability between flexion extension.  No acute fractures.\par \par AP pelvis demonstrates normal appearance of the hips bilaterally.  No acute fractures.  No significant degeneration.

## 2020-06-15 NOTE — DISCUSSION/SUMMARY
[Medication Risks Reviewed] : Medication risks reviewed [de-identified] : The patient today presents with symptoms of low back pain with some radiation into her left groin.  Symptoms are consistent with an acute disc inflammation associated with muscular spasm.  She is allergic to NSAIDs.  Recommended use of lidocaine patch Tylenol and prescribed her tizanidine as a muscle relaxant.  Also recommended physical therapy and lumbar corset for symptomatic relief.  If her symptoms persist or worsen a course of oral steroids may also be considered.\par \par The patient was educated regarding their condition, treatment options as well as prescribed course of treatment. \par Risks and benefits as well as alternatives to the proposed treatment were also provided to the patient \par They were given the opportunity to have all their questions answered to their satisfaction.\par \par Vital signs were reviewed with the patient and the patient was instructed to followup with their primary care provider for further management.\par \par Healthy lifestyle recommendations were also made including a tobacco free lifestyle, proper diet, and weight control.

## 2020-06-15 NOTE — HISTORY OF PRESENT ILLNESS
[Worsening] : worsening [Daily] : ~He/She~ states the symptoms seem to be occuring daily [8] : a current pain level of 8/10 [None] : No relieving factors are noted [de-identified] : Patient is here today for evaluation on her acute low back into left hip area going on since Saturday 6/13/2020 no injury and been using over the counter lidocaine and tylenol which is doing nothing for her.\par Cannot take NSAIds because of allergy.\par Did a lot of housework over the past 2 weeks [___ days] : [unfilled] day(s) ago [de-identified] : everything

## 2020-06-17 ENCOUNTER — APPOINTMENT (OUTPATIENT)
Dept: INTERNAL MEDICINE | Facility: CLINIC | Age: 45
End: 2020-06-17
Payer: COMMERCIAL

## 2020-06-17 DIAGNOSIS — R00.2 PALPITATIONS: ICD-10-CM

## 2020-06-17 PROCEDURE — 93306 TTE W/DOPPLER COMPLETE: CPT | Mod: 26

## 2020-06-17 PROCEDURE — 93306 TTE W/DOPPLER COMPLETE: CPT

## 2020-07-20 ENCOUNTER — APPOINTMENT (OUTPATIENT)
Dept: SURGERY | Facility: CLINIC | Age: 45
End: 2020-07-20
Payer: COMMERCIAL

## 2020-07-20 VITALS — WEIGHT: 200 LBS | HEIGHT: 62 IN | BODY MASS INDEX: 36.8 KG/M2

## 2020-07-20 DIAGNOSIS — K80.20 CALCULUS OF GALLBLADDER W/OUT CHOLECYSTITIS W/OUT OBSTRUCTION: ICD-10-CM

## 2020-07-20 DIAGNOSIS — Z78.9 OTHER SPECIFIED HEALTH STATUS: ICD-10-CM

## 2020-07-20 PROCEDURE — 99203 OFFICE O/P NEW LOW 30 MIN: CPT | Mod: 95

## 2020-07-20 NOTE — PLAN
[FreeTextEntry1] : I have discussed my impression and treatment plan with the patient.  All surgical options were discussed including non-surgical treatments.  The patient would like to proceed with laparoscopic sleeve gastrectomy.  \par \par Benefits and risks of the planned surgery were discussed in depth, particularly leak, bleeding, sepsis, fistula, GERD, blood clots, dysphagia, malnutrition, weight regain, prolonged hospital stay and the remote possibility of death.   Also discussed was the importance of adhering to the recommended nutritional guidelines and supplements and attending regular follow-up.  I reviewed the critical importance of behavioral modification and follow-up in order to optimize outcomes and avoid complications. The patient was given the opportunity to ask pertinent questions and expressed good understanding of the aforementioned issues.\par \par I have recommended pulmonary evaluation and sleep study, as well as:\par \par - medical weight management program\par - upper endoscopy\par - nutritional evaluation\par - medical, pulmonary and cardiac clearance\par - psychological evaluation

## 2020-07-20 NOTE — ASSESSMENT
[FreeTextEntry1] : 44-year-old female with severe obesity (height 5 feet 2 inches, weight 200 pounds, BMI 36.5) and symptoms concerning for obstructive sleep apnea who is interested in pursuing bariatric surgery.\par \par The patient has failed multiple prior attempts at weight loss and is now dealing with the side effects, risk factors, and poor quality of life associated with morbid obesity.  They would benefit from surgical weight loss as outlined in the NIH and  ASMBS consensus statements on bariatric surgery.  \par \par My impression is that the patient is a reasonable candidate for laparoscopic leave gastrectomy.\par

## 2020-07-20 NOTE — HISTORY OF PRESENT ILLNESS
[Home] : at home, [unfilled] , at the time of the visit. [Medical Office: ([facility])___] : at the medical office located in  [Verbal consent obtained from patient] : the patient, [unfilled] [de-identified] : Debora Reilly is a 45 y/o female being seen for weight loss surgery consultation. \par \par The patient has been struggling with obesity for many years, noticing that her metabolism changes and it became much more difficult to lose weight once she turned 41.  She has attempted several diet and exercise programs without sustained success.  The excess weight is starting to significantly affect her health and lifestyle.  She has had elevated blood pressure readings at her doctor's office and elevated blood sugars.  She is interested in laparoscopic sleeve gastrectomy and knows several people who have had success after the surgery..\par \par Medical history is significant for borderline hypertension, elevated blood sugars.  Snores and has daytime sleepiness but has not had a evaluation for sleep apnea.\par Surgical history is significant for laparoscopic cholecystectomy.\par The patient denies prior history of blood clot or abnormal bleeding.\par The patient denies prior history of dysphagia.\par

## 2020-08-12 ENCOUNTER — APPOINTMENT (OUTPATIENT)
Dept: ORTHOPEDIC SURGERY | Facility: CLINIC | Age: 45
End: 2020-08-12

## 2020-08-12 NOTE — REASON FOR VISIT
[FreeTextEntry2] : lateral epicondylitis left elbow. PRP left elbow [Follow-Up Visit] : a follow-up visit for

## 2020-08-17 ENCOUNTER — APPOINTMENT (OUTPATIENT)
Dept: ORTHOPEDIC SURGERY | Facility: CLINIC | Age: 45
End: 2020-08-17
Payer: SELF-PAY

## 2020-08-17 VITALS — HEIGHT: 62 IN | WEIGHT: 200 LBS | BODY MASS INDEX: 36.8 KG/M2

## 2020-08-17 PROCEDURE — 0232T NJX PLATELET PLASMA: CPT | Mod: LT

## 2020-08-17 RX ORDER — BUPIVACAINE HCL/PF 2.5 MG/ML
0.25 VIAL (ML) INJECTION
Refills: 0 | Status: COMPLETED | OUTPATIENT
Start: 2020-08-17

## 2020-08-17 RX ADMIN — BUPIVACAINE HYDROCHLORIDE %: 2.5 INJECTION, SOLUTION INFILTRATION; PERINEURAL at 00:00

## 2020-09-28 ENCOUNTER — RESULT REVIEW (OUTPATIENT)
Age: 45
End: 2020-09-28

## 2020-10-05 ENCOUNTER — OUTPATIENT (OUTPATIENT)
Dept: OUTPATIENT SERVICES | Facility: HOSPITAL | Age: 45
LOS: 1 days | End: 2020-10-05
Payer: COMMERCIAL

## 2020-10-05 ENCOUNTER — APPOINTMENT (OUTPATIENT)
Dept: MAMMOGRAPHY | Facility: IMAGING CENTER | Age: 45
End: 2020-10-05
Payer: COMMERCIAL

## 2020-10-05 ENCOUNTER — RESULT REVIEW (OUTPATIENT)
Age: 45
End: 2020-10-05

## 2020-10-05 DIAGNOSIS — Z98.890 OTHER SPECIFIED POSTPROCEDURAL STATES: Chronic | ICD-10-CM

## 2020-10-05 DIAGNOSIS — Z00.8 ENCOUNTER FOR OTHER GENERAL EXAMINATION: ICD-10-CM

## 2020-10-05 PROCEDURE — 77063 BREAST TOMOSYNTHESIS BI: CPT

## 2020-10-05 PROCEDURE — 77067 SCR MAMMO BI INCL CAD: CPT

## 2020-10-05 PROCEDURE — 77067 SCR MAMMO BI INCL CAD: CPT | Mod: 26

## 2020-10-05 PROCEDURE — 77063 BREAST TOMOSYNTHESIS BI: CPT | Mod: 26

## 2020-10-09 ENCOUNTER — OUTPATIENT (OUTPATIENT)
Dept: OUTPATIENT SERVICES | Facility: HOSPITAL | Age: 45
LOS: 1 days | End: 2020-10-09
Payer: COMMERCIAL

## 2020-10-09 ENCOUNTER — RESULT REVIEW (OUTPATIENT)
Age: 45
End: 2020-10-09

## 2020-10-09 ENCOUNTER — APPOINTMENT (OUTPATIENT)
Dept: MAMMOGRAPHY | Facility: IMAGING CENTER | Age: 45
End: 2020-10-09
Payer: COMMERCIAL

## 2020-10-09 ENCOUNTER — APPOINTMENT (OUTPATIENT)
Dept: ULTRASOUND IMAGING | Facility: IMAGING CENTER | Age: 45
End: 2020-10-09
Payer: COMMERCIAL

## 2020-10-09 DIAGNOSIS — Z00.8 ENCOUNTER FOR OTHER GENERAL EXAMINATION: ICD-10-CM

## 2020-10-09 DIAGNOSIS — Z98.890 OTHER SPECIFIED POSTPROCEDURAL STATES: Chronic | ICD-10-CM

## 2020-10-09 PROCEDURE — G0279: CPT

## 2020-10-09 PROCEDURE — G0279: CPT | Mod: 26

## 2020-10-09 PROCEDURE — 76641 ULTRASOUND BREAST COMPLETE: CPT | Mod: 26,RT

## 2020-10-09 PROCEDURE — 77065 DX MAMMO INCL CAD UNI: CPT

## 2020-10-09 PROCEDURE — 76641 ULTRASOUND BREAST COMPLETE: CPT

## 2020-10-09 PROCEDURE — 77065 DX MAMMO INCL CAD UNI: CPT | Mod: 26,RT

## 2020-10-29 RX ORDER — METHYLPREDNISOLONE 4 MG/1
4 TABLET ORAL
Qty: 1 | Refills: 0 | Status: ACTIVE | COMMUNITY
Start: 2020-10-29 | End: 1900-01-01

## 2021-03-22 LAB
M TB IFN-G BLD-IMP: NEGATIVE
QUANTIFERON TB PLUS MITOGEN MINUS NIL: 8.45 IU/ML
QUANTIFERON TB PLUS NIL: 0.02 IU/ML
QUANTIFERON TB PLUS TB1 MINUS NIL: 0 IU/ML
QUANTIFERON TB PLUS TB2 MINUS NIL: 0.01 IU/ML

## 2021-03-31 ENCOUNTER — APPOINTMENT (OUTPATIENT)
Dept: ORTHOPEDIC SURGERY | Facility: CLINIC | Age: 46
End: 2021-03-31
Payer: COMMERCIAL

## 2021-03-31 VITALS — WEIGHT: 20 LBS | BODY MASS INDEX: 3.68 KG/M2 | HEIGHT: 62 IN | TEMPERATURE: 97.8 F

## 2021-03-31 PROCEDURE — 99072 ADDL SUPL MATRL&STAF TM PHE: CPT

## 2021-03-31 PROCEDURE — 99214 OFFICE O/P EST MOD 30 MIN: CPT | Mod: 25

## 2021-03-31 PROCEDURE — 20605 DRAIN/INJ JOINT/BURSA W/O US: CPT | Mod: LT

## 2021-03-31 RX ADMIN — METHYLPREDNISOLONE ACETATE 0.75 MG/ML: 40 INJECTION, SUSPENSION INTRALESIONAL; INTRAMUSCULAR; INTRASYNOVIAL; SOFT TISSUE at 00:00

## 2021-03-31 RX ADMIN — LIDOCAINE HYDROCHLORIDE 0.75 %: 10 INJECTION, SOLUTION INFILTRATION; PERINEURAL at 00:00

## 2021-04-15 RX ORDER — LIDOCAINE HYDROCHLORIDE 10 MG/ML
1 INJECTION, SOLUTION INFILTRATION; PERINEURAL
Qty: 0 | Refills: 0 | Status: COMPLETED | OUTPATIENT
Start: 2021-03-31

## 2021-04-15 RX ORDER — METHYLPRED ACET/NACL,ISO-OS/PF 40 MG/ML
40 VIAL (ML) INJECTION
Qty: 1 | Refills: 0 | Status: COMPLETED | OUTPATIENT
Start: 2021-03-31

## 2021-07-15 ENCOUNTER — APPOINTMENT (OUTPATIENT)
Dept: BARIATRICS/WEIGHT MGMT | Facility: CLINIC | Age: 46
End: 2021-07-15
Payer: COMMERCIAL

## 2021-07-15 PROCEDURE — 99205 OFFICE O/P NEW HI 60 MIN: CPT | Mod: 95

## 2021-07-19 NOTE — ASSESSMENT
[FreeTextEntry1] : -discussed plant based at length\par -she is interested in trying it\par -I said i'm fine with continuing ketogenic for now, see how this goes\par -I did discuss the substantial drawbacks of ketogenic\par -starting saxenda\par -continue activity\par -will discuss fasting as an added measure in the future\par \par Bariatric surgery history: none\par Overweight / obesity comorbidities: none\par Anti-obesity medications: none\par Obesity medication side effects: n/a\par

## 2021-07-19 NOTE — HISTORY OF PRESENT ILLNESS
[Home] : at home, [unfilled] , at the time of the visit. [Other Location: e.g. 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

## 2021-08-05 DIAGNOSIS — N39.0 URINARY TRACT INFECTION, SITE NOT SPECIFIED: ICD-10-CM

## 2021-08-05 RX ORDER — CIPROFLOXACIN HYDROCHLORIDE 500 MG/1
500 TABLET, FILM COATED ORAL
Qty: 10 | Refills: 0 | Status: ACTIVE | COMMUNITY
Start: 2021-08-05 | End: 1900-01-01

## 2021-08-12 ENCOUNTER — APPOINTMENT (OUTPATIENT)
Dept: BARIATRICS/WEIGHT MGMT | Facility: CLINIC | Age: 46
End: 2021-08-12
Payer: COMMERCIAL

## 2021-08-12 PROCEDURE — 99214 OFFICE O/P EST MOD 30 MIN: CPT | Mod: 95

## 2021-08-12 NOTE — ASSESSMENT
[FreeTextEntry1] : -increase saxenda to 1.2\par -continue exercise\par -continue plant-based keto per preference\par -will provide guidance to next dietary transition when ready\par \par Bariatric surgery history: none\par Overweight / obesity comorbidities: hld prediabetes\par Anti-obesity medications: saxenda\par Obesity medication side effects: none\par

## 2021-08-12 NOTE — HISTORY OF PRESENT ILLNESS
[Home] : at home, [unfilled] , at the time of the visit. [Other Location: e.g. Home (Enter Location, City,State)___] : at [unfilled] [Verbal consent obtained from patient] : the patient, [unfilled] [FreeTextEntry1] : Patient presents for weight loss and overweight/obese comorbidity management\par \par Ms sr has lost 14 lbs since last month\par she reports saxend is tolerated, not side effects, no effect on appetite yet\par continuing ketogenic, however has started to favor nuts and low carb fruits and vegetables more\par continues to use peleton more, 4x per week\par plans to transition to plant-leaning mediterranean\par \par Due to comorbidities this patient requires medical treatment for overweight / obesity\par

## 2021-08-14 ENCOUNTER — LABORATORY RESULT (OUTPATIENT)
Age: 46
End: 2021-08-14

## 2021-09-21 NOTE — H&P PST ADULT - NSANTHGENDERRD_ENT_A_CORE
No Dapsone Counseling: I discussed with the patient the risks of dapsone including but not limited to hemolytic anemia, agranulocytosis, rashes, methemoglobinemia, kidney failure, peripheral neuropathy, headaches, GI upset, and liver toxicity.  Patients who start dapsone require monitoring including baseline LFTs and weekly CBCs for the first month, then every month thereafter.  The patient verbalized understanding of the proper use and possible adverse effects of dapsone.  All of the patient's questions and concerns were addressed.

## 2021-10-12 ENCOUNTER — APPOINTMENT (OUTPATIENT)
Dept: MAMMOGRAPHY | Facility: IMAGING CENTER | Age: 46
End: 2021-10-12
Payer: COMMERCIAL

## 2021-10-12 ENCOUNTER — OUTPATIENT (OUTPATIENT)
Dept: OUTPATIENT SERVICES | Facility: HOSPITAL | Age: 46
LOS: 1 days | End: 2021-10-12
Payer: COMMERCIAL

## 2021-10-12 ENCOUNTER — APPOINTMENT (OUTPATIENT)
Dept: ULTRASOUND IMAGING | Facility: IMAGING CENTER | Age: 46
End: 2021-10-12
Payer: COMMERCIAL

## 2021-10-12 DIAGNOSIS — Z00.8 ENCOUNTER FOR OTHER GENERAL EXAMINATION: ICD-10-CM

## 2021-10-12 DIAGNOSIS — Z98.890 OTHER SPECIFIED POSTPROCEDURAL STATES: Chronic | ICD-10-CM

## 2021-10-12 PROCEDURE — 77067 SCR MAMMO BI INCL CAD: CPT | Mod: 26

## 2021-10-12 PROCEDURE — 76641 ULTRASOUND BREAST COMPLETE: CPT

## 2021-10-12 PROCEDURE — 77063 BREAST TOMOSYNTHESIS BI: CPT | Mod: 26

## 2021-10-12 PROCEDURE — 76641 ULTRASOUND BREAST COMPLETE: CPT | Mod: 26,50

## 2021-10-12 PROCEDURE — 77067 SCR MAMMO BI INCL CAD: CPT

## 2021-10-12 PROCEDURE — 77063 BREAST TOMOSYNTHESIS BI: CPT

## 2021-10-14 ENCOUNTER — APPOINTMENT (OUTPATIENT)
Dept: BARIATRICS/WEIGHT MGMT | Facility: CLINIC | Age: 46
End: 2021-10-14

## 2021-10-20 ENCOUNTER — APPOINTMENT (OUTPATIENT)
Dept: MAMMOGRAPHY | Facility: IMAGING CENTER | Age: 46
End: 2021-10-20
Payer: COMMERCIAL

## 2021-10-20 ENCOUNTER — OUTPATIENT (OUTPATIENT)
Dept: OUTPATIENT SERVICES | Facility: HOSPITAL | Age: 46
LOS: 1 days | End: 2021-10-20
Payer: COMMERCIAL

## 2021-10-20 ENCOUNTER — APPOINTMENT (OUTPATIENT)
Dept: ULTRASOUND IMAGING | Facility: IMAGING CENTER | Age: 46
End: 2021-10-20
Payer: COMMERCIAL

## 2021-10-20 DIAGNOSIS — Z98.890 OTHER SPECIFIED POSTPROCEDURAL STATES: Chronic | ICD-10-CM

## 2021-10-20 DIAGNOSIS — Z00.8 ENCOUNTER FOR OTHER GENERAL EXAMINATION: ICD-10-CM

## 2021-10-20 PROCEDURE — G0279: CPT | Mod: 26

## 2021-10-20 PROCEDURE — 77065 DX MAMMO INCL CAD UNI: CPT | Mod: 26,LT

## 2021-10-20 PROCEDURE — 76642 ULTRASOUND BREAST LIMITED: CPT | Mod: 26,LT

## 2021-10-20 PROCEDURE — G0279: CPT

## 2021-10-20 PROCEDURE — 76642 ULTRASOUND BREAST LIMITED: CPT

## 2021-10-20 PROCEDURE — 77065 DX MAMMO INCL CAD UNI: CPT

## 2021-11-23 ENCOUNTER — NON-APPOINTMENT (OUTPATIENT)
Age: 46
End: 2021-11-23

## 2021-11-23 ENCOUNTER — APPOINTMENT (OUTPATIENT)
Dept: ORTHOPEDIC SURGERY | Facility: CLINIC | Age: 46
End: 2021-11-23
Payer: COMMERCIAL

## 2021-11-23 VITALS — DIASTOLIC BLOOD PRESSURE: 67 MMHG | HEART RATE: 71 BPM | SYSTOLIC BLOOD PRESSURE: 107 MMHG

## 2021-11-23 PROCEDURE — 20605 DRAIN/INJ JOINT/BURSA W/O US: CPT | Mod: LT

## 2021-11-23 PROCEDURE — 99214 OFFICE O/P EST MOD 30 MIN: CPT | Mod: 25

## 2021-11-23 RX ORDER — METHYLPRED ACET/NACL,ISO-OS/PF 40 MG/ML
40 VIAL (ML) INJECTION
Qty: 1 | Refills: 0 | Status: COMPLETED | OUTPATIENT
Start: 2021-11-23

## 2021-11-23 RX ORDER — LIDOCAINE HYDROCHLORIDE 10 MG/ML
1 INJECTION, SOLUTION INFILTRATION; PERINEURAL
Refills: 0 | Status: COMPLETED | OUTPATIENT
Start: 2021-11-23

## 2021-11-23 RX ADMIN — LIDOCAINE HYDROCHLORIDE 0.75 %: 10 INJECTION, SOLUTION INFILTRATION; PERINEURAL at 00:00

## 2021-11-23 RX ADMIN — METHYLPREDNISOLONE ACETATE 0.75 MG/ML: 40 INJECTION, SUSPENSION INTRA-ARTICULAR; INTRALESIONAL; INTRAMUSCULAR; SOFT TISSUE at 00:00

## 2021-12-17 ENCOUNTER — NON-APPOINTMENT (OUTPATIENT)
Age: 46
End: 2021-12-17

## 2021-12-17 DIAGNOSIS — Z20.822 CONTACT WITH AND (SUSPECTED) EXPOSURE TO COVID-19: ICD-10-CM

## 2021-12-18 LAB — SARS-COV-2 N GENE NPH QL NAA+PROBE: NOT DETECTED

## 2021-12-20 ENCOUNTER — RESULT REVIEW (OUTPATIENT)
Age: 46
End: 2021-12-20

## 2022-02-24 LAB
M TB IFN-G BLD-IMP: NEGATIVE
QUANTIFERON TB PLUS MITOGEN MINUS NIL: 9.65 IU/ML
QUANTIFERON TB PLUS NIL: 0.35 IU/ML
QUANTIFERON TB PLUS TB1 MINUS NIL: -0.17 IU/ML
QUANTIFERON TB PLUS TB2 MINUS NIL: -0.2 IU/ML

## 2022-04-05 DIAGNOSIS — L23.9 ALLERGIC CONTACT DERMATITIS, UNSPECIFIED CAUSE: ICD-10-CM

## 2022-04-05 RX ORDER — PREDNISONE 20 MG/1
20 TABLET ORAL DAILY
Qty: 10 | Refills: 0 | Status: ACTIVE | COMMUNITY
Start: 2022-04-05 | End: 1900-01-01

## 2022-08-04 ENCOUNTER — RESULT REVIEW (OUTPATIENT)
Age: 47
End: 2022-08-04

## 2022-09-13 ENCOUNTER — NON-APPOINTMENT (OUTPATIENT)
Age: 47
End: 2022-09-13

## 2022-09-13 ENCOUNTER — APPOINTMENT (OUTPATIENT)
Dept: ORTHOPEDIC SURGERY | Facility: CLINIC | Age: 47
End: 2022-09-13

## 2022-09-13 VITALS — SYSTOLIC BLOOD PRESSURE: 120 MMHG | DIASTOLIC BLOOD PRESSURE: 81 MMHG | HEART RATE: 80 BPM

## 2022-09-13 PROCEDURE — 20605 DRAIN/INJ JOINT/BURSA W/O US: CPT | Mod: LT

## 2022-09-13 PROCEDURE — 99214 OFFICE O/P EST MOD 30 MIN: CPT | Mod: 25

## 2022-09-13 NOTE — PHYSICAL EXAM
[de-identified] : The patient appears well nourished  and in no apparent distress.  The patient is alert and oriented to person, place, and time.   Affect and mood appear normal.    The head is normocephalic and atraumatic.  The eyes reveal normal sclera and extra ocular muscles are intact.   The neck appears normal with no jugular venous distention or masses noted.   Skin shows normal turgor with no evidence of eczema or psoriasis.  No respiratory distress noted.  The patient ambulates with a normal gait.\par \par The left elbow has full range of motion in flexion/extension, as well as pronation/supination.  There is discomfort with terminal motion especially flexion.  There is tenderness to palpation over the lateral epicondyle.  Slight tenderness noted over the radial tunnel..  . There is no soft tissue swelling.  There is no warmth or erythema.  There is no instability to varus or valgus stress.  Elbow strength is normal. Sensation is normal.     Pulses and capillary refill are normal.     No clubbing, cyanosis, or edema noted. No lymphadenopathy.

## 2022-09-13 NOTE — PROCEDURE
[de-identified] : Using sterile technique, 0.75cc of depomedrol (40mg/ml) and 0.75cc of 1% plain lidocaine was drawn up into a sterile 3cc syringe.  A sterile 25 gauge needle was then affixed to the syringe.\par The left lateral epicondylar area was then sterilely prepped with chlorhexidine and ethylene chloride spray was used as an anesthetic prior to injection.  The depomedrol/lidocaine mixture was injected just distal to the epicondyle.  The patient tolerated the procedure well without difficulty.  The patient was given instructions on the use of ice and anti-inflammatories post injection site soreness.

## 2022-09-13 NOTE — HISTORY OF PRESENT ILLNESS
[de-identified] : This patient presents for follow-up regarding left elbow lateral epicondylitis.  Patient is complaining of recurrence of the left elbow pain for about 1 week.  She notes significant pain 7 out of 10.  Pain worse with range of motion of the elbow as well as lifting pushing and pulling objects with the left upper extremity.  She has occasional radiation down the forearm towards the wrist with occasional numbness and tingling.

## 2022-09-13 NOTE — DISCUSSION/SUMMARY
[de-identified] : The patient presented today for follow-up regarding left elbow lateral epicondylitis.  Patient is having recurrence of the symptoms over the last week.  Her physical exam consistent with recurrent lateral epicondylitis.  I discussed treatment options with the patient.  She did not do well with PRP injection last year.  I recommended performed a subsequent steroid injection to the lateral epicondyle area.  Instructions are given postinjection for ice analgesics and modification of activities.  I will see her back in the office if the symptoms do not improve in the next week.  At least 30 minutes was spent performing the evaluation and management on today's office visit.

## 2022-09-13 NOTE — REASON FOR VISIT
[Follow-Up Visit] : a follow-up visit for [FreeTextEntry2] : Lateral epicondylitis of left elbow, left elbow pain 7/10

## 2022-10-13 ENCOUNTER — OUTPATIENT (OUTPATIENT)
Dept: OUTPATIENT SERVICES | Facility: HOSPITAL | Age: 47
LOS: 1 days | End: 2022-10-13
Payer: COMMERCIAL

## 2022-10-13 ENCOUNTER — APPOINTMENT (OUTPATIENT)
Dept: ULTRASOUND IMAGING | Facility: IMAGING CENTER | Age: 47
End: 2022-10-13

## 2022-10-13 ENCOUNTER — APPOINTMENT (OUTPATIENT)
Dept: MAMMOGRAPHY | Facility: IMAGING CENTER | Age: 47
End: 2022-10-13

## 2022-10-13 DIAGNOSIS — Z98.890 OTHER SPECIFIED POSTPROCEDURAL STATES: Chronic | ICD-10-CM

## 2022-10-13 DIAGNOSIS — Z00.8 ENCOUNTER FOR OTHER GENERAL EXAMINATION: ICD-10-CM

## 2022-10-13 PROCEDURE — 77063 BREAST TOMOSYNTHESIS BI: CPT | Mod: 26

## 2022-10-13 PROCEDURE — 77067 SCR MAMMO BI INCL CAD: CPT

## 2022-10-13 PROCEDURE — 77063 BREAST TOMOSYNTHESIS BI: CPT

## 2022-10-13 PROCEDURE — 76641 ULTRASOUND BREAST COMPLETE: CPT | Mod: 26,50

## 2022-10-13 PROCEDURE — 77067 SCR MAMMO BI INCL CAD: CPT | Mod: 26

## 2022-10-13 PROCEDURE — 76641 ULTRASOUND BREAST COMPLETE: CPT

## 2022-10-20 RX ORDER — METHYLPRED ACET/NACL,ISO-OS/PF 40 MG/ML
40 VIAL (ML) INJECTION
Qty: 1 | Refills: 0 | Status: COMPLETED | OUTPATIENT
Start: 2022-10-20

## 2022-10-20 RX ADMIN — METHYLPREDNISOLONE ACETATE MG/ML: 40 INJECTION, SUSPENSION INTRA-ARTICULAR; INTRALESIONAL; INTRAMUSCULAR; SOFT TISSUE at 00:00

## 2023-02-14 ENCOUNTER — APPOINTMENT (OUTPATIENT)
Dept: GASTROENTEROLOGY | Facility: CLINIC | Age: 48
End: 2023-02-14
Payer: COMMERCIAL

## 2023-02-14 VITALS
SYSTOLIC BLOOD PRESSURE: 124 MMHG | WEIGHT: 166 LBS | TEMPERATURE: 98.1 F | BODY MASS INDEX: 30.55 KG/M2 | DIASTOLIC BLOOD PRESSURE: 70 MMHG | HEART RATE: 74 BPM | OXYGEN SATURATION: 98 % | HEIGHT: 62 IN

## 2023-02-14 DIAGNOSIS — K21.9 GASTRO-ESOPHAGEAL REFLUX DISEASE W/OUT ESOPHAGITIS: ICD-10-CM

## 2023-02-14 PROCEDURE — 99213 OFFICE O/P EST LOW 20 MIN: CPT

## 2023-02-22 RX ORDER — METHYLPREDNISOLONE 4 MG/1
4 TABLET ORAL
Qty: 1 | Refills: 0 | Status: ACTIVE | COMMUNITY
Start: 2020-06-17 | End: 1900-01-01

## 2023-02-23 RX ORDER — METHYLPREDNISOLONE 4 MG/1
4 TABLET ORAL
Qty: 1 | Refills: 0 | Status: ACTIVE | COMMUNITY
Start: 2023-02-23 | End: 1900-01-01

## 2023-05-18 RX ORDER — CLOBETASOL PROPIONATE 0.5 MG/G
0.05 CREAM TOPICAL TWICE DAILY
Qty: 30 | Refills: 2 | Status: ACTIVE | COMMUNITY
Start: 2018-09-24 | End: 1900-01-01

## 2023-07-05 ENCOUNTER — APPOINTMENT (OUTPATIENT)
Dept: BARIATRICS/WEIGHT MGMT | Facility: CLINIC | Age: 48
End: 2023-07-05
Payer: COMMERCIAL

## 2023-07-05 VITALS
SYSTOLIC BLOOD PRESSURE: 118 MMHG | WEIGHT: 169 LBS | DIASTOLIC BLOOD PRESSURE: 70 MMHG | BODY MASS INDEX: 31.91 KG/M2 | HEIGHT: 61 IN | HEART RATE: 85 BPM | OXYGEN SATURATION: 98 %

## 2023-07-05 DIAGNOSIS — R73.03 PREDIABETES.: ICD-10-CM

## 2023-07-05 DIAGNOSIS — E78.5 HYPERLIPIDEMIA, UNSPECIFIED: ICD-10-CM

## 2023-07-05 DIAGNOSIS — E66.01 MORBID (SEVERE) OBESITY DUE TO EXCESS CALORIES: ICD-10-CM

## 2023-07-05 PROCEDURE — 99215 OFFICE O/P EST HI 40 MIN: CPT

## 2023-07-05 NOTE — ASSESSMENT
[FreeTextEntry1] : Bariatric surgery history: none\par Overweight / obesity comorbidities: hld predm\par Current anti-obesity medications: none\par Obesity medication side effects: n/a\par \par -nutrition concepts reviewed and resources provided\par -reviewed briefly the westernization of diet and UPF and ABF as central to epidemic\par -will start saxenda and titrate to effect\par -continue follow ups following insurance approval\par

## 2023-07-05 NOTE — HISTORY OF PRESENT ILLNESS
[FreeTextEntry1] : Patient presents for weight loss and overweight/obese comorbidity management\par \par Debora did v well with ketogenic lost ~50 lbs\par and transitioned off and now is following low carb\par but having slow and steady regain and wants to get on track\par recalls some of my previous dietary advice\par and would like make more changes to lose weight\par would also like some help with medication\par current WL goal is 140 lbs\par continues pelaton 2x weekly\par \par Due to comorbidities this patient requires medical treatment for overweight / obesity\par

## 2023-07-13 RX ORDER — LIRAGLUTIDE 6 MG/ML
18 INJECTION, SOLUTION SUBCUTANEOUS DAILY
Qty: 2 | Refills: 2 | Status: DISCONTINUED | COMMUNITY
Start: 2021-07-15 | End: 2023-07-13

## 2023-07-13 RX ORDER — PEN NEEDLE, DIABETIC 32 GX 1/4"
32G X 6 MM NEEDLE, DISPOSABLE MISCELLANEOUS
Qty: 30 | Refills: 3 | Status: DISCONTINUED | COMMUNITY
Start: 2021-07-15 | End: 2023-07-13

## 2023-07-13 RX ORDER — PEN NEEDLE, DIABETIC 29 G X1/2"
32G X 4 MM NEEDLE, DISPOSABLE MISCELLANEOUS
Qty: 1 | Refills: 5 | Status: ACTIVE | COMMUNITY
Start: 2023-07-13 | End: 1900-01-01

## 2023-07-13 RX ORDER — LIRAGLUTIDE 6 MG/ML
18 INJECTION, SOLUTION SUBCUTANEOUS
Qty: 5 | Refills: 5 | Status: ACTIVE | COMMUNITY
Start: 2023-07-13 | End: 1900-01-01

## 2023-07-20 ENCOUNTER — APPOINTMENT (OUTPATIENT)
Dept: MRI IMAGING | Facility: CLINIC | Age: 48
End: 2023-07-20
Payer: COMMERCIAL

## 2023-07-20 ENCOUNTER — OUTPATIENT (OUTPATIENT)
Dept: OUTPATIENT SERVICES | Facility: HOSPITAL | Age: 48
LOS: 1 days | End: 2023-07-20
Payer: COMMERCIAL

## 2023-07-20 DIAGNOSIS — Z98.890 OTHER SPECIFIED POSTPROCEDURAL STATES: Chronic | ICD-10-CM

## 2023-07-20 DIAGNOSIS — M77.12 LATERAL EPICONDYLITIS, LEFT ELBOW: ICD-10-CM

## 2023-07-20 DIAGNOSIS — Z00.8 ENCOUNTER FOR OTHER GENERAL EXAMINATION: ICD-10-CM

## 2023-07-20 PROCEDURE — 73221 MRI JOINT UPR EXTREM W/O DYE: CPT | Mod: 26,LT

## 2023-07-20 PROCEDURE — 73221 MRI JOINT UPR EXTREM W/O DYE: CPT

## 2023-07-21 ENCOUNTER — APPOINTMENT (OUTPATIENT)
Dept: ORTHOPEDIC SURGERY | Facility: CLINIC | Age: 48
End: 2023-07-21
Payer: COMMERCIAL

## 2023-07-21 VITALS — HEART RATE: 99 BPM | SYSTOLIC BLOOD PRESSURE: 138 MMHG | DIASTOLIC BLOOD PRESSURE: 82 MMHG

## 2023-07-21 PROCEDURE — 99214 OFFICE O/P EST MOD 30 MIN: CPT

## 2023-07-21 NOTE — HISTORY OF PRESENT ILLNESS
[de-identified] : This patient presents for follow-up regarding left elbow lateral epicondylitis.  Patient continues to complain of chronic lateral elbow pain worse with activity and improved with rest.  Currently pain levels 4-5 out of 10.  Denies radicular symptoms or numbness and tingling.  She takes Tylenol for the pain.  She did have an MRI to evaluate for evidence of partial tearing and presents today for follow-up and to review the MRI results.

## 2023-07-21 NOTE — PHYSICAL EXAM
[de-identified] : The patient appears well nourished  and in no apparent distress.  The patient is alert and oriented to person, place, and time.   Affect and mood appear normal.    The head is normocephalic and atraumatic.  The eyes reveal normal sclera and extra ocular muscles are intact.   The neck appears normal with no jugular venous distention or masses noted.   Skin shows normal turgor with no evidence of eczema or psoriasis.  No respiratory distress noted.  The patient ambulates with a normal gait.\par \par The left elbow has full range of motion in flexion/extension, as well as pronation/supination.  There is discomfort with terminal motion especially flexion.  There is tenderness to palpation over the lateral epicondyle.  Slight tenderness noted over the radial tunnel..  . There is no soft tissue swelling.  There is no warmth or erythema.  There is no instability to varus or valgus stress.  Elbow strength is normal. Sensation is normal.     Pulses and capillary refill are normal.     No clubbing, cyanosis, or edema noted. No lymphadenopathy.  [de-identified] : MRI shows evidence of chronic mild lateral epicondylitis.  Mild medial epicondylitis is noted as well.

## 2023-07-21 NOTE — REASON FOR VISIT
[Follow-Up Visit] : a follow-up visit for [FreeTextEntry2] : Lateral epicondylitis of left elbow. MRI done on 7/20/23

## 2023-07-21 NOTE — DISCUSSION/SUMMARY
[de-identified] : This patient presents for follow-up regarding left elbow lateral epicondylitis.  The MRI confirms lateral epicondylitis of a chronic degree.  I discussed the diagnosis and treatment recommendations.  She has had chronic lateral elbow pain for a number of years that we have treated with intermittent cortisone injections however pain seems to return.  Based upon the MRI results and the patient's continued chronic pain and disability I recommended left elbow lateral epicondylar release.  The risks benefits and alternative treatment plans of the surgical procedure were explained to the patient. The patient had the opportunity to ask any questions which answered to their satisfaction. The patient will schedule surgery at their convenience.\par \par At least 30 minutes was spent performing the evaluation and management on today's office visit.  This includes but is not limited to preparing to see patient including review of any test results or outside medical records, obtaining and/or reviewing separately obtained history, performing examination and evaluation, counseling and educating the patient on their diagnosis and treatment recommendations, ordering medications, tests, or procedures, documenting clinical information in the electronic health record, independently interpreting results (not separately reported) and communicating results to the patient, and coordination of care.

## 2023-08-21 ENCOUNTER — OUTPATIENT (OUTPATIENT)
Dept: OUTPATIENT SERVICES | Facility: HOSPITAL | Age: 48
LOS: 1 days | End: 2023-08-21
Payer: COMMERCIAL

## 2023-08-21 VITALS
SYSTOLIC BLOOD PRESSURE: 106 MMHG | DIASTOLIC BLOOD PRESSURE: 76 MMHG | OXYGEN SATURATION: 99 % | HEIGHT: 62 IN | WEIGHT: 169.09 LBS | RESPIRATION RATE: 12 BRPM | TEMPERATURE: 98 F | HEART RATE: 80 BPM

## 2023-08-21 DIAGNOSIS — Z98.890 OTHER SPECIFIED POSTPROCEDURAL STATES: Chronic | ICD-10-CM

## 2023-08-21 DIAGNOSIS — M77.12 LATERAL EPICONDYLITIS, LEFT ELBOW: ICD-10-CM

## 2023-08-21 DIAGNOSIS — Z01.818 ENCOUNTER FOR OTHER PREPROCEDURAL EXAMINATION: ICD-10-CM

## 2023-08-21 DIAGNOSIS — Z90.49 ACQUIRED ABSENCE OF OTHER SPECIFIED PARTS OF DIGESTIVE TRACT: Chronic | ICD-10-CM

## 2023-08-21 LAB
ANION GAP SERPL CALC-SCNC: 10 MMOL/L — SIGNIFICANT CHANGE UP (ref 5–17)
BUN SERPL-MCNC: 14 MG/DL — SIGNIFICANT CHANGE UP (ref 7–23)
CALCIUM SERPL-MCNC: 9.5 MG/DL — SIGNIFICANT CHANGE UP (ref 8.4–10.5)
CHLORIDE SERPL-SCNC: 105 MMOL/L — SIGNIFICANT CHANGE UP (ref 96–108)
CO2 SERPL-SCNC: 26 MMOL/L — SIGNIFICANT CHANGE UP (ref 22–31)
CREAT SERPL-MCNC: 0.99 MG/DL — SIGNIFICANT CHANGE UP (ref 0.5–1.3)
EGFR: 71 ML/MIN/1.73M2 — SIGNIFICANT CHANGE UP
GLUCOSE SERPL-MCNC: 98 MG/DL — SIGNIFICANT CHANGE UP (ref 70–99)
HCT VFR BLD CALC: 44.9 % — SIGNIFICANT CHANGE UP (ref 34.5–45)
HGB BLD-MCNC: 13.7 G/DL — SIGNIFICANT CHANGE UP (ref 11.5–15.5)
MCHC RBC-ENTMCNC: 27 PG — SIGNIFICANT CHANGE UP (ref 27–34)
MCHC RBC-ENTMCNC: 30.5 GM/DL — LOW (ref 32–36)
MCV RBC AUTO: 88.6 FL — SIGNIFICANT CHANGE UP (ref 80–100)
NRBC # BLD: 0 /100 WBCS — SIGNIFICANT CHANGE UP (ref 0–0)
PLATELET # BLD AUTO: 287 K/UL — SIGNIFICANT CHANGE UP (ref 150–400)
POTASSIUM SERPL-MCNC: 5 MMOL/L — SIGNIFICANT CHANGE UP (ref 3.5–5.3)
POTASSIUM SERPL-SCNC: 5 MMOL/L — SIGNIFICANT CHANGE UP (ref 3.5–5.3)
RBC # BLD: 5.07 M/UL — SIGNIFICANT CHANGE UP (ref 3.8–5.2)
RBC # FLD: 13 % — SIGNIFICANT CHANGE UP (ref 10.3–14.5)
SODIUM SERPL-SCNC: 141 MMOL/L — SIGNIFICANT CHANGE UP (ref 135–145)
WBC # BLD: 8.93 K/UL — SIGNIFICANT CHANGE UP (ref 3.8–10.5)
WBC # FLD AUTO: 8.93 K/UL — SIGNIFICANT CHANGE UP (ref 3.8–10.5)

## 2023-08-21 PROCEDURE — 36415 COLL VENOUS BLD VENIPUNCTURE: CPT

## 2023-08-21 PROCEDURE — G0463: CPT

## 2023-08-21 PROCEDURE — 93005 ELECTROCARDIOGRAM TRACING: CPT

## 2023-08-21 PROCEDURE — 80048 BASIC METABOLIC PNL TOTAL CA: CPT

## 2023-08-21 PROCEDURE — 93010 ELECTROCARDIOGRAM REPORT: CPT

## 2023-08-21 PROCEDURE — 85027 COMPLETE CBC AUTOMATED: CPT

## 2023-08-21 NOTE — H&P PST ADULT - PROBLEM SELECTOR PLAN 1
Left elbow lateral epicondylar release is planned for 9/8/2023  Diagnostic testing performed  Pre op instructions were reviewed and given in writing   best wishes offered

## 2023-08-21 NOTE — H&P PST ADULT - MUSCULOSKELETAL
normal/ROM intact/normal gait/strength 5/5 bilateral upper extremities/strength 5/5 bilateral lower extremities/extremities exam details…

## 2023-08-21 NOTE — H&P PST ADULT - HISTORY OF PRESENT ILLNESS
49 yo female reports pain in left elbow with movement.    She is scheduled for left elbow lateral epicondylar release on 9/8/2023 @ [facility].

## 2023-08-21 NOTE — H&P PST ADULT - NSICDXPASTMEDICALHX_GEN_ALL_CORE_FT
PAST MEDICAL HISTORY:  Lateral epicondylitis, left elbow     Left cataract     Multiple allergies     Uses birth control

## 2023-08-21 NOTE — H&P PST ADULT - NSICDXPASTSURGICALHX_GEN_ALL_CORE_FT
PAST SURGICAL HISTORY:  History of bilateral breast reduction surgery     History of cholecystectomy

## 2023-09-05 PROBLEM — M77.12 LATERAL EPICONDYLITIS, LEFT ELBOW: Chronic | Status: ACTIVE | Noted: 2023-08-21

## 2023-09-05 PROBLEM — Z78.9 OTHER SPECIFIED HEALTH STATUS: Chronic | Status: ACTIVE | Noted: 2023-08-21

## 2023-09-05 PROBLEM — Z88.9 ALLERGY STATUS TO UNSPECIFIED DRUGS, MEDICAMENTS AND BIOLOGICAL SUBSTANCES: Chronic | Status: ACTIVE | Noted: 2023-08-21

## 2023-09-05 PROBLEM — H26.9 UNSPECIFIED CATARACT: Chronic | Status: ACTIVE | Noted: 2023-08-21

## 2023-09-06 RX ORDER — OXYCODONE AND ACETAMINOPHEN 5; 325 MG/1; MG/1
5-325 TABLET ORAL
Qty: 30 | Refills: 0 | Status: ACTIVE | COMMUNITY
Start: 2023-09-06 | End: 1900-01-01

## 2023-09-06 RX ORDER — PREDNISONE 20 MG/1
20 TABLET ORAL
Qty: 8 | Refills: 0 | Status: ACTIVE | COMMUNITY
Start: 2023-09-06 | End: 1900-01-01

## 2023-09-07 ENCOUNTER — TRANSCRIPTION ENCOUNTER (OUTPATIENT)
Age: 48
End: 2023-09-07

## 2023-09-08 ENCOUNTER — APPOINTMENT (OUTPATIENT)
Dept: ORTHOPEDIC SURGERY | Facility: HOSPITAL | Age: 48
End: 2023-09-08

## 2023-09-08 ENCOUNTER — OUTPATIENT (OUTPATIENT)
Dept: OUTPATIENT SERVICES | Facility: HOSPITAL | Age: 48
LOS: 1 days | End: 2023-09-08
Payer: COMMERCIAL

## 2023-09-08 ENCOUNTER — TRANSCRIPTION ENCOUNTER (OUTPATIENT)
Age: 48
End: 2023-09-08

## 2023-09-08 VITALS
OXYGEN SATURATION: 98 % | RESPIRATION RATE: 23 BRPM | DIASTOLIC BLOOD PRESSURE: 72 MMHG | TEMPERATURE: 98 F | WEIGHT: 171.96 LBS | HEIGHT: 61 IN | SYSTOLIC BLOOD PRESSURE: 105 MMHG | HEART RATE: 82 BPM

## 2023-09-08 VITALS — HEART RATE: 88 BPM | RESPIRATION RATE: 18 BRPM | OXYGEN SATURATION: 97 %

## 2023-09-08 DIAGNOSIS — Z98.890 OTHER SPECIFIED POSTPROCEDURAL STATES: Chronic | ICD-10-CM

## 2023-09-08 DIAGNOSIS — Z90.49 ACQUIRED ABSENCE OF OTHER SPECIFIED PARTS OF DIGESTIVE TRACT: Chronic | ICD-10-CM

## 2023-09-08 DIAGNOSIS — M77.12 LATERAL EPICONDYLITIS, LEFT ELBOW: ICD-10-CM

## 2023-09-08 LAB — HCG UR QL: NEGATIVE — SIGNIFICANT CHANGE UP

## 2023-09-08 PROCEDURE — 24358 REPAIR ELBOW W/DEB OPEN: CPT | Mod: LT

## 2023-09-08 PROCEDURE — 24359 REPAIR ELBOW DEB/ATTCH OPEN: CPT | Mod: LT

## 2023-09-08 PROCEDURE — 81025 URINE PREGNANCY TEST: CPT

## 2023-09-08 RX ORDER — SODIUM CHLORIDE 9 MG/ML
1000 INJECTION, SOLUTION INTRAVENOUS
Refills: 0 | Status: DISCONTINUED | OUTPATIENT
Start: 2023-09-08 | End: 2023-09-08

## 2023-09-08 RX ORDER — HYDROMORPHONE HYDROCHLORIDE 2 MG/ML
0.25 INJECTION INTRAMUSCULAR; INTRAVENOUS; SUBCUTANEOUS
Refills: 0 | Status: DISCONTINUED | OUTPATIENT
Start: 2023-09-08 | End: 2023-09-08

## 2023-09-08 RX ORDER — APREPITANT 80 MG/1
40 CAPSULE ORAL ONCE
Refills: 0 | Status: COMPLETED | OUTPATIENT
Start: 2023-09-08 | End: 2023-09-08

## 2023-09-08 RX ORDER — ONDANSETRON 8 MG/1
4 TABLET, FILM COATED ORAL ONCE
Refills: 0 | Status: DISCONTINUED | OUTPATIENT
Start: 2023-09-08 | End: 2023-09-08

## 2023-09-08 RX ORDER — OXYCODONE HYDROCHLORIDE 5 MG/1
5 TABLET ORAL ONCE
Refills: 0 | Status: DISCONTINUED | OUTPATIENT
Start: 2023-09-08 | End: 2023-09-08

## 2023-09-08 RX ORDER — HYDROMORPHONE HYDROCHLORIDE 2 MG/ML
0.5 INJECTION INTRAMUSCULAR; INTRAVENOUS; SUBCUTANEOUS
Refills: 0 | Status: DISCONTINUED | OUTPATIENT
Start: 2023-09-08 | End: 2023-09-08

## 2023-09-08 RX ORDER — CHLORHEXIDINE GLUCONATE 213 G/1000ML
1 SOLUTION TOPICAL ONCE
Refills: 0 | Status: COMPLETED | OUTPATIENT
Start: 2023-09-08 | End: 2023-09-08

## 2023-09-08 RX ORDER — LIRAGLUTIDE 6 MG/ML
1.2 INJECTION SUBCUTANEOUS
Refills: 0 | DISCHARGE

## 2023-09-08 RX ORDER — CEFAZOLIN SODIUM 1 G
2000 VIAL (EA) INJECTION ONCE
Refills: 0 | Status: COMPLETED | OUTPATIENT
Start: 2023-09-08 | End: 2023-09-08

## 2023-09-08 RX ORDER — DROSPIRENONE 4 MG/1
1 TABLET, FILM COATED ORAL
Refills: 0 | DISCHARGE

## 2023-09-08 RX ADMIN — CHLORHEXIDINE GLUCONATE 1 APPLICATION(S): 213 SOLUTION TOPICAL at 07:00

## 2023-09-08 RX ADMIN — OXYCODONE HYDROCHLORIDE 5 MILLIGRAM(S): 5 TABLET ORAL at 09:22

## 2023-09-08 RX ADMIN — OXYCODONE HYDROCHLORIDE 5 MILLIGRAM(S): 5 TABLET ORAL at 10:00

## 2023-09-08 RX ADMIN — SODIUM CHLORIDE 75 MILLILITER(S): 9 INJECTION, SOLUTION INTRAVENOUS at 09:29

## 2023-09-08 NOTE — ASU DISCHARGE PLAN (ADULT/PEDIATRIC) - COMMENTS
Call office to make an appt to be seen in approx 10 days Call office to make an appt to be seen in approx 10 days.

## 2023-09-08 NOTE — PROVIDER CONTACT NOTE (MEDICATION) - SITUATION
Pt is on weight loss medication, Saxenda.  Pt took her dose of saxenda medication yesterday approx 8am.

## 2023-09-08 NOTE — ASU DISCHARGE PLAN (ADULT/PEDIATRIC) - CARE PROVIDER_API CALL
Dixon Haskins  Orthopaedic Surgery  [address], [address]  [place] [address]  Phone: [phone]  Fax: [phone]  Follow Up Time:

## 2023-09-08 NOTE — ASU DISCHARGE PLAN (ADULT/PEDIATRIC) - NS MD DC FALL RISK RISK
For information on Fall & Injury Prevention, visit: [email or web address] OR  [email or web address] OR  https://www.cdc.gov/steadi/patient.html

## 2023-09-08 NOTE — ASU DISCHARGE PLAN (ADULT/PEDIATRIC) - ACTIVITY LEVEL
Sling for comfort/Elevate extremity Sling and posey block for comfort/Elevate extremity Sling and posey block for comfort/No weight bearing/Elevate extremity

## 2023-09-08 NOTE — ASU DISCHARGE PLAN (ADULT/PEDIATRIC) - ASU DC SPECIAL INSTRUCTIONSFT
Call MD for severe pain/fever/chills    Elevate arm to decrease pain/swelling (use sling to elevate)    Keep dressing clean and dry until office visit.  Cover with a waterproof bag & tape while showering    Pain medication as prescribed in the office, take with food.  Take a stool softener like senna to decrease constipation Call MD for severe pain/fever/chills    Elevate arm to decrease pain/swelling (use sling to elevate)    Keep dressing clean and dry until office visit.  Cover with a waterproof bag & tape while showering    Pain medication as prescribed in the office, take with food.  Take a stool softener like senna to decrease constipation    Feel Better!

## 2023-09-08 NOTE — PROVIDER CONTACT NOTE (MEDICATION) - ACTION/TREATMENT ORDERED:
Dr. Haskins and anesthesia dept made aware and spoke to patient of the risks involved. Pt made her own decision to proceed with surgery with the current risks involved.

## 2023-09-08 NOTE — ASU PATIENT PROFILE, ADULT - FALL HARM RISK - UNIVERSAL INTERVENTIONS
Bed in lowest position, wheels locked, appropriate side rails in place/Call bell, personal items and telephone in reach/Instruct patient to call for assistance before getting out of bed or chair/Non-slip footwear when patient is out of bed/[place] to call system/Physically safe environment - no spills, clutter or unnecessary equipment/Purposeful Proactive Rounding/Room/bathroom lighting operational, light cord in reach

## 2023-09-12 ENCOUNTER — APPOINTMENT (OUTPATIENT)
Dept: ORTHOPEDIC SURGERY | Facility: CLINIC | Age: 48
End: 2023-09-12
Payer: COMMERCIAL

## 2023-09-12 VITALS — TEMPERATURE: 97.9 F | SYSTOLIC BLOOD PRESSURE: 113 MMHG | HEART RATE: 109 BPM | DIASTOLIC BLOOD PRESSURE: 76 MMHG

## 2023-09-12 PROCEDURE — 99024 POSTOP FOLLOW-UP VISIT: CPT

## 2023-09-18 ENCOUNTER — APPOINTMENT (OUTPATIENT)
Dept: ORTHOPEDIC SURGERY | Facility: CLINIC | Age: 48
End: 2023-09-18
Payer: COMMERCIAL

## 2023-09-18 VITALS — DIASTOLIC BLOOD PRESSURE: 73 MMHG | SYSTOLIC BLOOD PRESSURE: 106 MMHG | HEART RATE: 97 BPM

## 2023-09-18 PROCEDURE — 99024 POSTOP FOLLOW-UP VISIT: CPT

## 2023-09-25 ENCOUNTER — APPOINTMENT (OUTPATIENT)
Dept: ORTHOPEDIC SURGERY | Facility: CLINIC | Age: 48
End: 2023-09-25
Payer: COMMERCIAL

## 2023-09-25 VITALS — DIASTOLIC BLOOD PRESSURE: 69 MMHG | HEART RATE: 91 BPM | SYSTOLIC BLOOD PRESSURE: 101 MMHG

## 2023-09-25 DIAGNOSIS — Z47.89 ENCOUNTER FOR OTHER ORTHOPEDIC AFTERCARE: ICD-10-CM

## 2023-09-25 PROCEDURE — 99024 POSTOP FOLLOW-UP VISIT: CPT

## 2023-10-16 ENCOUNTER — RESULT REVIEW (OUTPATIENT)
Age: 48
End: 2023-10-16

## 2023-10-16 ENCOUNTER — APPOINTMENT (OUTPATIENT)
Dept: MAMMOGRAPHY | Facility: IMAGING CENTER | Age: 48
End: 2023-10-16
Payer: COMMERCIAL

## 2023-10-16 ENCOUNTER — APPOINTMENT (OUTPATIENT)
Dept: ULTRASOUND IMAGING | Facility: IMAGING CENTER | Age: 48
End: 2023-10-16
Payer: COMMERCIAL

## 2023-10-16 ENCOUNTER — OUTPATIENT (OUTPATIENT)
Dept: OUTPATIENT SERVICES | Facility: HOSPITAL | Age: 48
LOS: 1 days | End: 2023-10-16
Payer: COMMERCIAL

## 2023-10-16 DIAGNOSIS — Z00.8 ENCOUNTER FOR OTHER GENERAL EXAMINATION: ICD-10-CM

## 2023-10-16 DIAGNOSIS — Z98.890 OTHER SPECIFIED POSTPROCEDURAL STATES: Chronic | ICD-10-CM

## 2023-10-16 DIAGNOSIS — Z90.49 ACQUIRED ABSENCE OF OTHER SPECIFIED PARTS OF DIGESTIVE TRACT: Chronic | ICD-10-CM

## 2023-10-16 PROCEDURE — 77067 SCR MAMMO BI INCL CAD: CPT

## 2023-10-16 PROCEDURE — 76641 ULTRASOUND BREAST COMPLETE: CPT

## 2023-10-16 PROCEDURE — 76641 ULTRASOUND BREAST COMPLETE: CPT | Mod: 26,50

## 2023-10-16 PROCEDURE — 77063 BREAST TOMOSYNTHESIS BI: CPT

## 2023-10-16 PROCEDURE — 77067 SCR MAMMO BI INCL CAD: CPT | Mod: 26

## 2023-10-16 PROCEDURE — 77063 BREAST TOMOSYNTHESIS BI: CPT | Mod: 26

## 2023-10-24 ENCOUNTER — APPOINTMENT (OUTPATIENT)
Dept: ORTHOPEDIC SURGERY | Facility: CLINIC | Age: 48
End: 2023-10-24
Payer: COMMERCIAL

## 2023-10-24 DIAGNOSIS — M79.643 PAIN IN UNSPECIFIED HAND: ICD-10-CM

## 2023-10-24 DIAGNOSIS — G56.01 CARPAL TUNNEL SYNDROME, RIGHT UPPER LIMB: ICD-10-CM

## 2023-10-24 PROCEDURE — 73130 X-RAY EXAM OF HAND: CPT | Mod: RT

## 2023-10-24 PROCEDURE — 99214 OFFICE O/P EST MOD 30 MIN: CPT | Mod: 24

## 2023-11-03 RX ORDER — SEMAGLUTIDE 0.5 MG/.5ML
0.5 INJECTION, SOLUTION SUBCUTANEOUS
Qty: 1 | Refills: 5 | Status: ACTIVE | COMMUNITY
Start: 2023-11-03 | End: 1900-01-01

## 2023-11-10 RX ORDER — TRIAMCINOLONE ACETONIDE 1 MG/G
0.1 CREAM TOPICAL TWICE DAILY
Qty: 1 | Refills: 0 | Status: ACTIVE | COMMUNITY
Start: 2023-11-10 | End: 1900-01-01

## 2023-11-13 ENCOUNTER — TRANSCRIPTION ENCOUNTER (OUTPATIENT)
Age: 48
End: 2023-11-13

## 2023-11-15 ENCOUNTER — TRANSCRIPTION ENCOUNTER (OUTPATIENT)
Age: 48
End: 2023-11-15

## 2023-11-21 ENCOUNTER — APPOINTMENT (OUTPATIENT)
Dept: ORTHOPEDIC SURGERY | Facility: CLINIC | Age: 48
End: 2023-11-21
Payer: COMMERCIAL

## 2023-11-21 PROCEDURE — 99024 POSTOP FOLLOW-UP VISIT: CPT

## 2023-12-05 ENCOUNTER — APPOINTMENT (OUTPATIENT)
Dept: ORTHOPEDIC SURGERY | Facility: CLINIC | Age: 48
End: 2023-12-05
Payer: COMMERCIAL

## 2023-12-05 VITALS — SYSTOLIC BLOOD PRESSURE: 118 MMHG | DIASTOLIC BLOOD PRESSURE: 81 MMHG | HEART RATE: 106 BPM

## 2023-12-05 DIAGNOSIS — M77.12 LATERAL EPICONDYLITIS, LEFT ELBOW: ICD-10-CM

## 2023-12-05 PROCEDURE — 99024 POSTOP FOLLOW-UP VISIT: CPT

## 2024-01-25 ENCOUNTER — APPOINTMENT (OUTPATIENT)
Dept: OBGYN | Facility: CLINIC | Age: 49
End: 2024-01-25
Payer: COMMERCIAL

## 2024-01-25 ENCOUNTER — RESULT REVIEW (OUTPATIENT)
Age: 49
End: 2024-01-25

## 2024-01-25 PROCEDURE — 99396 PREV VISIT EST AGE 40-64: CPT

## 2024-02-19 NOTE — H&P PST ADULT - BP NONINVASIVE SYSTOLIC (MM HG)
Post-Care Instructions: I reviewed with the patient in detail post-care instructions. Patient is to wear sunprotection, and avoid picking at any of the treated lesions. Pt may apply Vaseline to crusted or scabbing areas. Render Post-Care Instructions In Note?: no Detail Level: Detailed Show Applicator Variable?: Yes Duration Of Freeze Thaw-Cycle (Seconds): 0 Consent: The patient's consent was obtained including but not limited to risks of crusting, scabbing, blistering, scarring, darker or lighter pigmentary change, recurrence, incomplete removal and infection. Medical Necessity Information: It is in your best interest to select a reason for this procedure from the list below. All of these items fulfill various CMS LCD requirements except the new and changing color options. Medical Necessity Clause: This procedure was medically necessary because the lesions that were treated were: Spray Paint Text: The liquid nitrogen was applied to the skin utilizing a spray paint frosting technique. 106

## 2024-04-29 RX ORDER — CLOTRIMAZOLE AND BETAMETHASONE DIPROPIONATE 10; .5 MG/G; MG/G
1-0.05 CREAM TOPICAL TWICE DAILY
Qty: 1 | Refills: 1 | Status: ACTIVE | COMMUNITY
Start: 2024-04-29 | End: 1900-01-01

## 2024-08-15 RX ORDER — METHYLPREDNISOLONE 4 MG/1
4 TABLET ORAL
Qty: 1 | Refills: 0 | Status: ACTIVE | COMMUNITY
Start: 2024-08-15 | End: 1900-01-01

## 2024-08-27 ENCOUNTER — APPOINTMENT (OUTPATIENT)
Dept: OBGYN | Facility: CLINIC | Age: 49
End: 2024-08-27
Payer: COMMERCIAL

## 2024-08-27 PROCEDURE — 99213 OFFICE O/P EST LOW 20 MIN: CPT

## 2024-08-27 PROCEDURE — 36415 COLL VENOUS BLD VENIPUNCTURE: CPT

## 2024-10-08 ENCOUNTER — TRANSCRIPTION ENCOUNTER (OUTPATIENT)
Age: 49
End: 2024-10-08

## 2024-10-09 ENCOUNTER — TRANSCRIPTION ENCOUNTER (OUTPATIENT)
Age: 49
End: 2024-10-09

## 2024-10-11 ENCOUNTER — APPOINTMENT (OUTPATIENT)
Dept: BARIATRICS/WEIGHT MGMT | Facility: CLINIC | Age: 49
End: 2024-10-11
Payer: COMMERCIAL

## 2024-10-11 VITALS — BODY MASS INDEX: 32.5 KG/M2 | WEIGHT: 172 LBS

## 2024-10-11 DIAGNOSIS — R73.03 PREDIABETES.: ICD-10-CM

## 2024-10-11 DIAGNOSIS — E66.01 MORBID (SEVERE) OBESITY DUE TO EXCESS CALORIES: ICD-10-CM

## 2024-10-11 DIAGNOSIS — E78.5 HYPERLIPIDEMIA, UNSPECIFIED: ICD-10-CM

## 2024-10-11 PROCEDURE — 99214 OFFICE O/P EST MOD 30 MIN: CPT

## 2024-10-11 RX ORDER — TIRZEPATIDE 2.5 MG/.5ML
2.5 INJECTION, SOLUTION SUBCUTANEOUS
Qty: 1 | Refills: 0 | Status: ACTIVE | COMMUNITY
Start: 2024-10-11 | End: 1900-01-01

## 2024-10-24 RX ORDER — TIRZEPATIDE 5 MG/.5ML
5 INJECTION, SOLUTION SUBCUTANEOUS
Qty: 1 | Refills: 2 | Status: ACTIVE | COMMUNITY
Start: 2024-10-24 | End: 1900-01-01

## 2024-12-11 ENCOUNTER — TRANSCRIPTION ENCOUNTER (OUTPATIENT)
Age: 49
End: 2024-12-11

## 2024-12-13 ENCOUNTER — APPOINTMENT (OUTPATIENT)
Dept: BARIATRICS/WEIGHT MGMT | Facility: CLINIC | Age: 49
End: 2024-12-13
Payer: COMMERCIAL

## 2024-12-13 VITALS — WEIGHT: 160 LBS | BODY MASS INDEX: 30.23 KG/M2

## 2024-12-13 DIAGNOSIS — E78.5 HYPERLIPIDEMIA, UNSPECIFIED: ICD-10-CM

## 2024-12-13 DIAGNOSIS — E66.01 MORBID (SEVERE) OBESITY DUE TO EXCESS CALORIES: ICD-10-CM

## 2024-12-13 PROCEDURE — 99214 OFFICE O/P EST MOD 30 MIN: CPT

## 2025-01-29 ENCOUNTER — APPOINTMENT (OUTPATIENT)
Dept: OBGYN | Facility: CLINIC | Age: 50
End: 2025-01-29
Payer: COMMERCIAL

## 2025-01-29 ENCOUNTER — RESULT REVIEW (OUTPATIENT)
Age: 50
End: 2025-01-29

## 2025-01-29 PROCEDURE — 96127 BRIEF EMOTIONAL/BEHAV ASSMT: CPT

## 2025-01-29 PROCEDURE — 99396 PREV VISIT EST AGE 40-64: CPT

## 2025-02-14 ENCOUNTER — APPOINTMENT (OUTPATIENT)
Dept: BARIATRICS/WEIGHT MGMT | Facility: CLINIC | Age: 50
End: 2025-02-14
Payer: COMMERCIAL

## 2025-02-14 DIAGNOSIS — E78.5 HYPERLIPIDEMIA, UNSPECIFIED: ICD-10-CM

## 2025-02-14 DIAGNOSIS — E66.01 MORBID (SEVERE) OBESITY DUE TO EXCESS CALORIES: ICD-10-CM

## 2025-02-14 DIAGNOSIS — R73.03 PREDIABETES.: ICD-10-CM

## 2025-02-14 PROCEDURE — 99214 OFFICE O/P EST MOD 30 MIN: CPT | Mod: 95

## 2025-02-16 VITALS — WEIGHT: 150 LBS | BODY MASS INDEX: 28.34 KG/M2

## 2025-03-06 ENCOUNTER — OUTPATIENT (OUTPATIENT)
Dept: OUTPATIENT SERVICES | Facility: HOSPITAL | Age: 50
LOS: 1 days | End: 2025-03-06
Payer: COMMERCIAL

## 2025-03-06 ENCOUNTER — RESULT REVIEW (OUTPATIENT)
Age: 50
End: 2025-03-06

## 2025-03-06 ENCOUNTER — APPOINTMENT (OUTPATIENT)
Dept: ULTRASOUND IMAGING | Facility: IMAGING CENTER | Age: 50
End: 2025-03-06
Payer: COMMERCIAL

## 2025-03-06 ENCOUNTER — APPOINTMENT (OUTPATIENT)
Dept: MAMMOGRAPHY | Facility: IMAGING CENTER | Age: 50
End: 2025-03-06
Payer: COMMERCIAL

## 2025-03-06 DIAGNOSIS — Z90.49 ACQUIRED ABSENCE OF OTHER SPECIFIED PARTS OF DIGESTIVE TRACT: Chronic | ICD-10-CM

## 2025-03-06 DIAGNOSIS — Z98.890 OTHER SPECIFIED POSTPROCEDURAL STATES: Chronic | ICD-10-CM

## 2025-03-06 DIAGNOSIS — Z00.8 ENCOUNTER FOR OTHER GENERAL EXAMINATION: ICD-10-CM

## 2025-03-06 PROCEDURE — 76641 ULTRASOUND BREAST COMPLETE: CPT

## 2025-03-06 PROCEDURE — 77063 BREAST TOMOSYNTHESIS BI: CPT | Mod: 26

## 2025-03-06 PROCEDURE — 77067 SCR MAMMO BI INCL CAD: CPT | Mod: 26

## 2025-03-06 PROCEDURE — 76641 ULTRASOUND BREAST COMPLETE: CPT | Mod: 26,50

## 2025-03-06 PROCEDURE — 77067 SCR MAMMO BI INCL CAD: CPT

## 2025-03-06 PROCEDURE — 77063 BREAST TOMOSYNTHESIS BI: CPT

## 2025-04-03 LAB
ALBUMIN SERPL ELPH-MCNC: 4.2 G/DL
ALP BLD-CCNC: 72 U/L
ALT SERPL-CCNC: 18 U/L
ANION GAP SERPL CALC-SCNC: 12 MMOL/L
AST SERPL-CCNC: 19 U/L
BILIRUB SERPL-MCNC: 0.3 MG/DL
BUN SERPL-MCNC: 11 MG/DL
CALCIUM SERPL-MCNC: 9.4 MG/DL
CHLORIDE SERPL-SCNC: 104 MMOL/L
CO2 SERPL-SCNC: 24 MMOL/L
CREAT SERPL-MCNC: 0.85 MG/DL
EGFRCR SERPLBLD CKD-EPI 2021: 84 ML/MIN/1.73M2
GLUCOSE SERPL-MCNC: 84 MG/DL
HCT VFR BLD CALC: 37.9 %
HGB BLD-MCNC: 12.1 G/DL
MCHC RBC-ENTMCNC: 27.4 PG
MCHC RBC-ENTMCNC: 31.9 G/DL
MCV RBC AUTO: 85.7 FL
PLATELET # BLD AUTO: 314 K/UL
POTASSIUM SERPL-SCNC: 4.5 MMOL/L
PROT SERPL-MCNC: 7.4 G/DL
RBC # BLD: 4.42 M/UL
RBC # FLD: 13.4 %
SODIUM SERPL-SCNC: 140 MMOL/L
TSH SERPL-ACNC: 2.66 UIU/ML
WBC # FLD AUTO: 6.7 K/UL

## 2025-04-07 ENCOUNTER — TRANSCRIPTION ENCOUNTER (OUTPATIENT)
Age: 50
End: 2025-04-07

## 2025-05-02 ENCOUNTER — APPOINTMENT (OUTPATIENT)
Dept: BARIATRICS/WEIGHT MGMT | Facility: CLINIC | Age: 50
End: 2025-05-02
Payer: COMMERCIAL

## 2025-05-02 ENCOUNTER — NON-APPOINTMENT (OUTPATIENT)
Age: 50
End: 2025-05-02

## 2025-05-02 VITALS — BODY MASS INDEX: 25.7 KG/M2 | WEIGHT: 136 LBS

## 2025-05-02 DIAGNOSIS — R73.03 PREDIABETES.: ICD-10-CM

## 2025-05-02 DIAGNOSIS — E66.01 MORBID (SEVERE) OBESITY DUE TO EXCESS CALORIES: ICD-10-CM

## 2025-05-02 DIAGNOSIS — E78.5 HYPERLIPIDEMIA, UNSPECIFIED: ICD-10-CM

## 2025-05-02 PROCEDURE — 99214 OFFICE O/P EST MOD 30 MIN: CPT | Mod: 95

## 2025-08-05 ENCOUNTER — RESULT REVIEW (OUTPATIENT)
Age: 50
End: 2025-08-05

## 2025-08-05 ENCOUNTER — APPOINTMENT (OUTPATIENT)
Dept: INTERNAL MEDICINE | Facility: CLINIC | Age: 50
End: 2025-08-05
Payer: COMMERCIAL

## 2025-08-05 VITALS
BODY MASS INDEX: 24.17 KG/M2 | HEIGHT: 61 IN | SYSTOLIC BLOOD PRESSURE: 112 MMHG | DIASTOLIC BLOOD PRESSURE: 74 MMHG | OXYGEN SATURATION: 99 % | HEART RATE: 88 BPM | WEIGHT: 128 LBS

## 2025-08-05 DIAGNOSIS — R73.03 PREDIABETES.: ICD-10-CM

## 2025-08-05 DIAGNOSIS — Z13.29 ENCOUNTER FOR SCREENING FOR OTHER SUSPECTED ENDOCRINE DISORDER: ICD-10-CM

## 2025-08-05 DIAGNOSIS — R22.2 LOCALIZED SWELLING, MASS AND LUMP, TRUNK: ICD-10-CM

## 2025-08-05 DIAGNOSIS — Z00.00 ENCOUNTER FOR GENERAL ADULT MEDICAL EXAMINATION W/OUT ABNORMAL FINDINGS: ICD-10-CM

## 2025-08-05 DIAGNOSIS — Z13.21 ENCOUNTER FOR SCREENING FOR NUTRITIONAL DISORDER: ICD-10-CM

## 2025-08-05 DIAGNOSIS — Z12.11 ENCOUNTER FOR SCREENING FOR MALIGNANT NEOPLASM OF COLON: ICD-10-CM

## 2025-08-05 PROCEDURE — 99386 PREV VISIT NEW AGE 40-64: CPT

## 2025-08-05 PROCEDURE — 36415 COLL VENOUS BLD VENIPUNCTURE: CPT

## 2025-08-07 ENCOUNTER — NON-APPOINTMENT (OUTPATIENT)
Age: 50
End: 2025-08-07

## 2025-08-08 ENCOUNTER — APPOINTMENT (OUTPATIENT)
Dept: BARIATRICS/WEIGHT MGMT | Facility: CLINIC | Age: 50
End: 2025-08-08
Payer: COMMERCIAL

## 2025-08-08 ENCOUNTER — OUTPATIENT (OUTPATIENT)
Dept: OUTPATIENT SERVICES | Facility: HOSPITAL | Age: 50
LOS: 1 days | End: 2025-08-08
Payer: COMMERCIAL

## 2025-08-08 ENCOUNTER — APPOINTMENT (OUTPATIENT)
Dept: ULTRASOUND IMAGING | Facility: CLINIC | Age: 50
End: 2025-08-08
Payer: COMMERCIAL

## 2025-08-08 VITALS — WEIGHT: 127 LBS | BODY MASS INDEX: 24 KG/M2

## 2025-08-08 DIAGNOSIS — E66.01 MORBID (SEVERE) OBESITY DUE TO EXCESS CALORIES: ICD-10-CM

## 2025-08-08 DIAGNOSIS — Z98.890 OTHER SPECIFIED POSTPROCEDURAL STATES: Chronic | ICD-10-CM

## 2025-08-08 DIAGNOSIS — E78.5 HYPERLIPIDEMIA, UNSPECIFIED: ICD-10-CM

## 2025-08-08 DIAGNOSIS — R22.2 LOCALIZED SWELLING, MASS AND LUMP, TRUNK: ICD-10-CM

## 2025-08-08 DIAGNOSIS — Z90.49 ACQUIRED ABSENCE OF OTHER SPECIFIED PARTS OF DIGESTIVE TRACT: Chronic | ICD-10-CM

## 2025-08-08 PROCEDURE — 76705 ECHO EXAM OF ABDOMEN: CPT | Mod: 26

## 2025-08-08 PROCEDURE — 99213 OFFICE O/P EST LOW 20 MIN: CPT | Mod: 95

## 2025-08-08 PROCEDURE — 76705 ECHO EXAM OF ABDOMEN: CPT

## 2025-08-09 LAB
25(OH)D3 SERPL-MCNC: 23.2 NG/ML
ALBUMIN SERPL ELPH-MCNC: 4.4 G/DL
ALP BLD-CCNC: 75 U/L
ALT SERPL-CCNC: 22 U/L
ANION GAP SERPL CALC-SCNC: 19 MMOL/L
AST SERPL-CCNC: 21 U/L
BASOPHILS # BLD AUTO: 0.1 K/UL
BASOPHILS NFR BLD AUTO: 1.2 %
BILIRUB SERPL-MCNC: 0.2 MG/DL
BUN SERPL-MCNC: 18 MG/DL
CALCIUM SERPL-MCNC: 10 MG/DL
CHLORIDE SERPL-SCNC: 104 MMOL/L
CHOLEST SERPL-MCNC: 207 MG/DL
CO2 SERPL-SCNC: 17 MMOL/L
CREAT SERPL-MCNC: 0.82 MG/DL
EGFRCR SERPLBLD CKD-EPI 2021: 88 ML/MIN/1.73M2
EOSINOPHIL # BLD AUTO: 0.14 K/UL
EOSINOPHIL NFR BLD AUTO: 1.7 %
ESTIMATED AVERAGE GLUCOSE: 100 MG/DL
GLUCOSE SERPL-MCNC: 90 MG/DL
HBA1C MFR BLD HPLC: 5.1 %
HCT VFR BLD CALC: 37.5 %
HDLC SERPL-MCNC: 62 MG/DL
HGB BLD-MCNC: 11.5 G/DL
IMM GRANULOCYTES NFR BLD AUTO: 0.4 %
LDLC SERPL-MCNC: 132 MG/DL
LYMPHOCYTES # BLD AUTO: 2.3 K/UL
LYMPHOCYTES NFR BLD AUTO: 28.2 %
MAN DIFF?: NORMAL
MCHC RBC-ENTMCNC: 26 PG
MCHC RBC-ENTMCNC: 30.7 G/DL
MCV RBC AUTO: 84.8 FL
MONOCYTES # BLD AUTO: 0.48 K/UL
MONOCYTES NFR BLD AUTO: 5.9 %
NEUTROPHILS # BLD AUTO: 5.11 K/UL
NEUTROPHILS NFR BLD AUTO: 62.6 %
NONHDLC SERPL-MCNC: 144 MG/DL
PLATELET # BLD AUTO: 321 K/UL
POTASSIUM SERPL-SCNC: 4.9 MMOL/L
PROT SERPL-MCNC: 8.2 G/DL
RBC # BLD: 4.42 M/UL
RBC # FLD: 13.8 %
SODIUM SERPL-SCNC: 140 MMOL/L
TRIGL SERPL-MCNC: 67 MG/DL
TSH SERPL-ACNC: 1.63 UIU/ML
VIT B12 SERPL-MCNC: 960 PG/ML
WBC # FLD AUTO: 8.16 K/UL

## (undated) DEVICE — GLV 8 PROTEXIS (WHITE)

## (undated) DEVICE — PACK UPPER EXTREMITY

## (undated) DEVICE — GLV 7.5 PROTEXIS (WHITE)

## (undated) DEVICE — SUT POLYSORB 2-0 30" GS-22 UNDYED

## (undated) DEVICE — DRSG WEBRIL 4"

## (undated) DEVICE — SLING ARM CHIEFTAIN MESH LARGE

## (undated) DEVICE — TOURNIQUET ESMARK 4"

## (undated) DEVICE — DRSG STOCKINETTE TUBULAR COTTON 2PLY 4X60"

## (undated) DEVICE — VENODYNE/SCD SLEEVE CALF MEDIUM

## (undated) DEVICE — WARMING BLANKET LOWER ADULT

## (undated) DEVICE — POSITIONER BOOT CRADLE

## (undated) DEVICE — SOL IRR POUR H2O 1000ML